# Patient Record
Sex: MALE | Race: WHITE | Employment: FULL TIME | URBAN - METROPOLITAN AREA
[De-identification: names, ages, dates, MRNs, and addresses within clinical notes are randomized per-mention and may not be internally consistent; named-entity substitution may affect disease eponyms.]

---

## 2017-01-17 ENCOUNTER — OFFICE VISIT (OUTPATIENT)
Dept: FAMILY MEDICINE CLINIC | Facility: CLINIC | Age: 47
End: 2017-01-17

## 2017-01-17 ENCOUNTER — APPOINTMENT (OUTPATIENT)
Dept: LAB | Age: 47
End: 2017-01-17
Attending: INTERNAL MEDICINE
Payer: COMMERCIAL

## 2017-01-17 VITALS
SYSTOLIC BLOOD PRESSURE: 120 MMHG | HEIGHT: 70 IN | RESPIRATION RATE: 16 BRPM | TEMPERATURE: 98 F | WEIGHT: 177 LBS | DIASTOLIC BLOOD PRESSURE: 80 MMHG | BODY MASS INDEX: 25.34 KG/M2 | HEART RATE: 80 BPM

## 2017-01-17 DIAGNOSIS — E11.65 TYPE 2 DIABETES MELLITUS WITH HYPERGLYCEMIA, WITHOUT LONG-TERM CURRENT USE OF INSULIN (HCC): ICD-10-CM

## 2017-01-17 PROBLEM — E11.9 TYPE 2 DIABETES MELLITUS WITHOUT COMPLICATION, WITHOUT LONG-TERM CURRENT USE OF INSULIN (HCC): Status: ACTIVE | Noted: 2017-01-17

## 2017-01-17 LAB
CREAT UR-SCNC: 57.3 MG/DL
MICROALBUMIN UR-MCNC: 1.67 MG/DL
MICROALBUMIN/CREAT 24H UR-RTO: 29.1 UG/MG (ref ?–30)

## 2017-01-17 PROCEDURE — 84681 ASSAY OF C-PEPTIDE: CPT

## 2017-01-17 PROCEDURE — 82043 UR ALBUMIN QUANTITATIVE: CPT

## 2017-01-17 PROCEDURE — 36415 COLL VENOUS BLD VENIPUNCTURE: CPT

## 2017-01-17 PROCEDURE — 99213 OFFICE O/P EST LOW 20 MIN: CPT | Performed by: INTERNAL MEDICINE

## 2017-01-17 PROCEDURE — 82570 ASSAY OF URINE CREATININE: CPT

## 2017-01-17 PROCEDURE — 83516 IMMUNOASSAY NONANTIBODY: CPT

## 2017-01-17 NOTE — PROGRESS NOTES
[facility] Internal Medicine Office Note  Chief Complaint:   Patient presents with:  Diabetes      HPI:   This is a 55year old male coming in for diabetes follow up  He tests glu once daily.  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  Disp: 180 tablet diabetes. Diagnoses and all orders for this visit:    Type 2 diabetes mellitus with hyperglycemia, without long-term current use of insulin (HCC) - increase metformin to 1000mg BID.  Call office in 2 weeks with glu readings  -f/u for ophtho exam -nereyda

## 2017-01-17 NOTE — PATIENT INSTRUCTIONS
Thank you for choosing Doug Garcia MD at [place] [address]  To Do: Aurelia Prasad  1. Blood work and urine today  2. Call office in 2 weeks with blood sugar readings  3.  Follow up [address] [facility] [place]  [id number] pneumonia shot recommende called informing you that the insurance company approved your testing, please call Central Scheduling at [phone]  Please allow our office 5 business days to contact you regarding any testing results.     Refill policies:   Allow 3 business days for ref

## 2017-01-19 LAB — C-PEPTIDE, SERUM OR PLASMA: 1.6 NG/ML

## 2017-01-20 LAB — GLUTAMIC ACID DECARBOXYLASE AB: 40.4 IU/ML

## 2017-01-24 ENCOUNTER — TELEPHONE (OUTPATIENT)
Dept: ENDOCRINOLOGY CLINIC | Facility: CLINIC | Age: 47
End: 2017-01-24

## 2017-01-24 DIAGNOSIS — E10.9 TYPE 1 DIABETES MELLITUS WITHOUT COMPLICATION (HCC): Primary | ICD-10-CM

## 2017-01-24 DIAGNOSIS — E10.9 TYPE 1 DIABETES MELLITUS MATURITY ONSET (HCC): Primary | ICD-10-CM

## 2017-01-24 NOTE — TELEPHONE ENCOUNTER
Pt is scheduled to come in tomorrow for insulin instruction. Please enter an order in Epic for this visit. ..for insulin/injectable instruction.

## 2017-01-25 ENCOUNTER — NURSE ONLY (OUTPATIENT)
Dept: ENDOCRINOLOGY CLINIC | Facility: CLINIC | Age: 47
End: 2017-01-25

## 2017-01-25 VITALS — BODY MASS INDEX: 22 KG/M2 | SYSTOLIC BLOOD PRESSURE: 132 MMHG | DIASTOLIC BLOOD PRESSURE: 78 MMHG | WEIGHT: 156.5 LBS

## 2017-01-25 DIAGNOSIS — E10.9 TYPE 1 DIABETES MELLITUS MATURITY ONSET (HCC): ICD-10-CM

## 2017-01-26 NOTE — PROGRESS NOTES
Chinyere Dueñas  : 1970 was seen for Injection Instruction:    Date: 17  Start time: 2pm End time: 3pm    /78 mmHg  Wt 156 lb 8 oz  Glucose today: 234 mg/dL  Date  Breakfast    Lunch    Dinner    Bedtime  Comments     Pre  Insulin Unit

## 2017-02-01 ENCOUNTER — NURSE ONLY (OUTPATIENT)
Dept: ENDOCRINOLOGY CLINIC | Facility: CLINIC | Age: 47
End: 2017-02-01

## 2017-02-01 DIAGNOSIS — E10.9 TYPE 1 DIABETES MELLITUS WITHOUT COMPLICATION (HCC): Primary | ICD-10-CM

## 2017-02-01 PROCEDURE — G0108 DIAB MANAGE TRN  PER INDIV: HCPCS | Performed by: DIETITIAN, REGISTERED

## 2017-02-02 NOTE — PROGRESS NOTES
Cole Boone  DOB12/6/1970 was seen for Diabetic Education Initial/Follow up:    Date: 2/1/2017   Referring MD: Dr. Corby Salinas Start time: 2pm End time: 2:30pm    Assessment:     Changes since last visit:  - Lifestyle: states he is living very health patterns: fastings remain elevated;premeal readings gradually reducing     Hyperglycemia  - Causes/symptoms/prevention/treatment  - High Blood Glucose: >300 mg/dL  - Contact MD if >2 BG >300 mg/dL in 1 week  - Testing urine for ketones with negative result

## 2017-03-01 ENCOUNTER — APPOINTMENT (OUTPATIENT)
Dept: ENDOCRINOLOGY CLINIC | Facility: CLINIC | Age: 47
End: 2017-03-01

## 2017-03-01 VITALS — BODY MASS INDEX: 26 KG/M2 | WEIGHT: 178 LBS

## 2017-03-01 DIAGNOSIS — E10.9 TYPE 1 DIABETES MELLITUS WITHOUT COMPLICATION (HCC): Primary | ICD-10-CM

## 2017-03-01 PROCEDURE — G0108 DIAB MANAGE TRN  PER INDIV: HCPCS | Performed by: DIETITIAN, REGISTERED

## 2017-03-01 RX ORDER — INSULIN ASPART 100 [IU]/ML
INJECTION, SOLUTION INTRAVENOUS; SUBCUTANEOUS
Qty: 15 ML | Refills: 6 | Status: SHIPPED | OUTPATIENT
Start: 2017-03-01 | End: 2018-01-24

## 2017-03-01 NOTE — PROGRESS NOTES
Thalia Ramirez  : 1970 was seen for Diabetic Education Initial/Follow up:    Date: 3/1/2017  Referring MD: Dr. Maryse Cosme Start time: 2pm End time: 2:30pm    Assessment:     Assessment:  Wt 178 lb    Changes since last visit:  - Kenyatta Calhoun ;provided w/ travel letter  Site inspection: no lipodystrophy    PROBLEM SOLVING:  Review SMBG/Food log;observe patterns: starting to experience elevated readings;suggested to add scale for elevated levels    Hyperglycemia  - Causes/symptoms/prevention/gianna

## 2017-03-15 PROBLEM — E10.65 TYPE 1 DIABETES MELLITUS WITH HYPERGLYCEMIA (HCC): Status: ACTIVE | Noted: 2017-03-15

## 2017-03-15 PROBLEM — Z79.4 LONG-TERM INSULIN USE (HCC): Status: ACTIVE | Noted: 2017-03-15

## 2017-03-23 ENCOUNTER — TELEPHONE (OUTPATIENT)
Dept: ENDOCRINOLOGY CLINIC | Facility: CLINIC | Age: 47
End: 2017-03-23

## 2017-03-29 NOTE — TELEPHONE ENCOUNTER
Pt. States that he will be going out of town for Spring Break so will call when he returns. Endo made aware.

## 2017-04-13 ENCOUNTER — TELEPHONE (OUTPATIENT)
Dept: ENDOCRINOLOGY CLINIC | Facility: CLINIC | Age: 47
End: 2017-04-13

## 2017-04-13 DIAGNOSIS — E10.65 TYPE 1 DIABETES MELLITUS WITH HYPERGLYCEMIA (HCC): Primary | ICD-10-CM

## 2017-04-13 NOTE — TELEPHONE ENCOUNTER
Pt. contacted me regarding Dr. Miller Sees suggestion to have a diagnostic Dexcom. I have pended the order for your signature.  Please advise

## 2017-04-24 NOTE — TELEPHONE ENCOUNTER
Pt. Contacted & scheduled for Diagnostic Dexcom this Thursday 4/27/17 for insertion & will return on Thursday 5/4/17 for download. Pt. V/u & was agreeable w/plan.

## 2017-04-27 ENCOUNTER — NURSE ONLY (OUTPATIENT)
Dept: ENDOCRINOLOGY CLINIC | Facility: CLINIC | Age: 47
End: 2017-04-27

## 2017-04-27 DIAGNOSIS — E10.65 TYPE 1 DIABETES MELLITUS WITH HYPERGLYCEMIA (HCC): Primary | ICD-10-CM

## 2017-04-27 PROCEDURE — 95250 CONT GLUC MNTR PHYS/QHP EQP: CPT | Performed by: DIETITIAN, REGISTERED

## 2017-04-27 NOTE — PROGRESS NOTES
Feelis Alberto  : 1970 was seen for Diagnostic Continuous Glucose Sensor Initial Evaluation:    Date: 2017  Start time: 3pm End time: 4pm      Dexcom G4 [place]  Rec.  #[id number]  Sensor Lot: [id number]  Expiration: 10/20/17  Sensor placed: Rt.

## 2017-05-03 ENCOUNTER — TELEPHONE (OUTPATIENT)
Dept: FAMILY MEDICINE CLINIC | Facility: CLINIC | Age: 47
End: 2017-05-03

## 2017-05-04 ENCOUNTER — TELEPHONE (OUTPATIENT)
Dept: FAMILY MEDICINE CLINIC | Facility: CLINIC | Age: 47
End: 2017-05-04

## 2017-05-04 ENCOUNTER — NURSE ONLY (OUTPATIENT)
Dept: ENDOCRINOLOGY CLINIC | Facility: CLINIC | Age: 47
End: 2017-05-04

## 2017-05-04 DIAGNOSIS — E10.65 TYPE 1 DIABETES MELLITUS WITH HYPERGLYCEMIA (HCC): Primary | ICD-10-CM

## 2017-05-04 PROCEDURE — G0108 DIAB MANAGE TRN  PER INDIV: HCPCS | Performed by: DIETITIAN, REGISTERED

## 2017-05-05 ENCOUNTER — TELEPHONE (OUTPATIENT)
Dept: ENDOCRINOLOGY CLINIC | Facility: CLINIC | Age: 47
End: 2017-05-05

## 2017-05-05 NOTE — PROGRESS NOTES
Leeroy Reno  : 1970 was seen for Continuous Glucose Sensor Follow-up Visit:    Date: 2017  Start time: 10;30am End time: 11am    Sensor Type: Dexcom G4    Site Assessment: no problems    Reviewed CGMS download and patient logs:  Days of s

## 2017-05-05 NOTE — TELEPHONE ENCOUNTER
Faxed completed Pt. insurance information & assignment of benefits form to [id number] w/confirmation received

## 2017-05-15 ENCOUNTER — TELEPHONE (OUTPATIENT)
Dept: FAMILY MEDICINE CLINIC | Facility: CLINIC | Age: 47
End: 2017-05-15

## 2017-05-15 NOTE — TELEPHONE ENCOUNTER
Orders for continuous glucose monitor faxed to us from [facility], but patient sees endocrinology, Dr. Kaitlin Packer. I sent back fax with that information. Should be filled out by MD managing DM.

## 2017-05-16 ENCOUNTER — TELEPHONE (OUTPATIENT)
Dept: FAMILY MEDICINE CLINIC | Facility: CLINIC | Age: 47
End: 2017-05-16

## 2017-05-26 ENCOUNTER — OFFICE VISIT (OUTPATIENT)
Dept: FAMILY MEDICINE CLINIC | Facility: CLINIC | Age: 47
End: 2017-05-26

## 2017-05-26 VITALS
SYSTOLIC BLOOD PRESSURE: 130 MMHG | WEIGHT: 186 LBS | HEIGHT: 70 IN | BODY MASS INDEX: 26.63 KG/M2 | DIASTOLIC BLOOD PRESSURE: 70 MMHG | RESPIRATION RATE: 16 BRPM | HEART RATE: 84 BPM

## 2017-05-26 DIAGNOSIS — Z00.00 WELLNESS EXAMINATION: Primary | ICD-10-CM

## 2017-05-26 DIAGNOSIS — E10.9 TYPE 1 DIABETES MELLITUS WITHOUT COMPLICATION (HCC): ICD-10-CM

## 2017-05-26 DIAGNOSIS — Z00.00 LABORATORY EXAM ORDERED AS PART OF ROUTINE GENERAL MEDICAL EXAMINATION: ICD-10-CM

## 2017-05-26 PROCEDURE — 99396 PREV VISIT EST AGE 40-64: CPT | Performed by: INTERNAL MEDICINE

## 2017-05-26 NOTE — PATIENT INSTRUCTIONS
Thank you for choosing Leonarda Craft MD at [place] [address]  To Do: Roise Oyster  1.  Consider prevnar vaccine for pneumonia and then pneumovax vaccine 1 year later    • Please signup for MY CHART, which is electronic access to your record if you h Central Scheduling at [phone]  Please allow our office 5 business days to contact you regarding any testing results.     Refill policies:   Allow 3 business days for refills; controlled substances may take longer and must be picked up from the office i

## 2017-05-26 NOTE — PROGRESS NOTES
[facility] Internal Medicine Office Note  Chief Complaint:   Patient presents with:  Diabetes      HPI:   This is a 55year old male coming in for diabetes follow up and physical  He follows with Dr. 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   Abdominal: Soft. Bowel sounds are normal.   Lymphadenopathy:     He has no cervical adenopathy. Neurological: He is alert. Psychiatric: He has a normal mood and affect.        ASSESSMENT AND P

## 2017-06-26 NOTE — ADDENDUM NOTE
Encounter addended by: Brook Meehan MA on: 6/26/2017  2:27 PM<BR>    Actions taken: Letter status changed

## 2017-07-12 PROBLEM — E10.65 TYPE 1 DIABETES MELLITUS WITH HYPERGLYCEMIA (HCC): Status: RESOLVED | Noted: 2017-03-15 | Resolved: 2017-07-12

## 2017-07-12 PROBLEM — E78.5 HYPERLIPIDEMIA, UNSPECIFIED: Status: ACTIVE | Noted: 2017-07-12

## 2017-08-31 ENCOUNTER — TELEPHONE (OUTPATIENT)
Dept: FAMILY MEDICINE CLINIC | Facility: CLINIC | Age: 47
End: 2017-08-31

## 2017-09-14 RX ORDER — INSULIN ASPART 100 [IU]/ML
INJECTION, SOLUTION INTRAVENOUS; SUBCUTANEOUS
Qty: 15 ML | Refills: 6 | Status: CANCELLED
Start: 2017-09-14

## 2017-09-14 NOTE — TELEPHONE ENCOUNTER
From: Cole Boone  Sent: 9/14/2017 10:11 AM CDT  Subject: Medication Renewal Request    Cole Boone would like a refill of the following medications:     insulin aspart (NOVOLOG) 100 UNIT/ML Subcutaneous Solution Gutierrez Valderrama MD]    [place]

## 2017-09-15 NOTE — TELEPHONE ENCOUNTER
Requesting Novolog   Filled: 3/1/17 #15ml  with 6 refills    Future Appointments  Date Time Provider Dominguez Pepe   10/11/2017 1:20 PM Natividad Davis MD [facility] AT [facility]       Confirmed with Peacham he still has refills available.      Time started: 131

## 2017-10-01 ENCOUNTER — MED REC SCAN ONLY (OUTPATIENT)
Dept: FAMILY MEDICINE CLINIC | Facility: CLINIC | Age: 47
End: 2017-10-01

## 2017-10-22 ENCOUNTER — HOSPITAL ENCOUNTER (EMERGENCY)
Age: 47
Discharge: HOME | End: 2017-10-22
Attending: EMERGENCY MEDICINE
Payer: COMMERCIAL

## 2017-10-22 VITALS
HEIGHT: 70 IN | OXYGEN SATURATION: 97 % | TEMPERATURE: 98 F | HEART RATE: 101 BPM | SYSTOLIC BLOOD PRESSURE: 144 MMHG | BODY MASS INDEX: 27.2 KG/M2 | DIASTOLIC BLOOD PRESSURE: 93 MMHG | WEIGHT: 190 LBS | RESPIRATION RATE: 20 BRPM

## 2017-10-22 DIAGNOSIS — S05.02XA ABRASION OF LEFT CORNEA, INITIAL ENCOUNTER: Primary | ICD-10-CM

## 2017-10-22 PROCEDURE — 99283 EMERGENCY DEPT VISIT LOW MDM: CPT

## 2017-10-22 RX ORDER — SULFACETAMIDE SODIUM 100 MG/ML
2 SOLUTION/ DROPS OPHTHALMIC 4 TIMES DAILY
Qty: 1 BOTTLE | Refills: 0 | Status: SHIPPED | OUTPATIENT
Start: 2017-10-22 | End: 2017-10-27

## 2017-10-22 RX ORDER — TETRACAINE HYDROCHLORIDE 5 MG/ML
SOLUTION OPHTHALMIC
Status: DISCONTINUED
Start: 2017-10-22 | End: 2017-10-22

## 2017-10-22 RX ORDER — TETRACAINE HYDROCHLORIDE 5 MG/ML
1 SOLUTION OPHTHALMIC ONCE
Status: COMPLETED | OUTPATIENT
Start: 2017-10-22 | End: 2017-10-22

## 2017-10-22 NOTE — ED PROVIDER NOTES
Patient Seen in: [facility] Emergency Department In [place]    History   Patient presents with: Eye Visual Problem (opthalmic)    Stated Complaint: Eye pain    HPI    51-year-old male presents emergency room with chief complaint of left eye injury.   Zaina 177.8 cm (5' 10\")   Wt 86.2 kg   SpO2 97%   BMI 27.26 kg/m²         Physical Exam    GENERAL: Patient is awake, alert, well-appearing, in no acute distress.   HEENT: Pupils equal round reactive to light, extraocular muscles are intact, there is no scleral

## 2018-05-16 PROCEDURE — 82570 ASSAY OF URINE CREATININE: CPT | Performed by: INTERNAL MEDICINE

## 2018-05-16 PROCEDURE — 84681 ASSAY OF C-PEPTIDE: CPT | Performed by: INTERNAL MEDICINE

## 2018-05-16 PROCEDURE — 82043 UR ALBUMIN QUANTITATIVE: CPT | Performed by: INTERNAL MEDICINE

## 2018-06-18 DIAGNOSIS — R74.8 ELEVATED LIVER ENZYMES: Primary | ICD-10-CM

## 2018-12-03 ENCOUNTER — TELEPHONE (OUTPATIENT)
Dept: INTERNAL MEDICINE CLINIC | Facility: CLINIC | Age: 48
End: 2018-12-03

## 2019-05-15 ENCOUNTER — LAB ENCOUNTER (OUTPATIENT)
Dept: LAB | Age: 49
End: 2019-05-15
Attending: INTERNAL MEDICINE
Payer: COMMERCIAL

## 2019-05-15 DIAGNOSIS — R74.8 ELEVATED LIVER ENZYMES: ICD-10-CM

## 2019-05-15 PROBLEM — R79.89 ELEVATED LFTS: Status: ACTIVE | Noted: 2019-05-15

## 2019-05-15 PROBLEM — E10.3313: Status: ACTIVE | Noted: 2019-05-15

## 2019-05-15 PROBLEM — I10 ESSENTIAL HYPERTENSION: Status: ACTIVE | Noted: 2019-05-15

## 2019-05-15 PROBLEM — E78.5 HYPERLIPIDEMIA, UNSPECIFIED HYPERLIPIDEMIA TYPE: Status: ACTIVE | Noted: 2017-07-12

## 2019-05-15 PROBLEM — E10.649 CONTROLLED TYPE 1 DIABETES MELLITUS WITH HYPOGLYCEMIA (HCC): Status: ACTIVE | Noted: 2017-01-17

## 2019-05-15 PROCEDURE — 83540 ASSAY OF IRON: CPT

## 2019-05-15 PROCEDURE — 86706 HEP B SURFACE ANTIBODY: CPT

## 2019-05-15 PROCEDURE — 87340 HEPATITIS B SURFACE AG IA: CPT

## 2019-05-15 PROCEDURE — 86803 HEPATITIS C AB TEST: CPT

## 2019-05-15 PROCEDURE — 86704 HEP B CORE ANTIBODY TOTAL: CPT

## 2019-05-15 PROCEDURE — 83550 IRON BINDING TEST: CPT

## 2019-05-20 DIAGNOSIS — R74.8 ELEVATED LIVER ENZYMES: Primary | ICD-10-CM

## 2020-05-15 ENCOUNTER — TELEPHONE (OUTPATIENT)
Dept: INTERNAL MEDICINE CLINIC | Facility: CLINIC | Age: 50
End: 2020-05-15

## 2021-04-19 ENCOUNTER — IMMUNIZATION (OUTPATIENT)
Dept: LAB | Facility: HOSPITAL | Age: 51
End: 2021-04-19
Attending: HOSPITALIST
Payer: COMMERCIAL

## 2021-04-19 DIAGNOSIS — Z23 NEED FOR VACCINATION: Primary | ICD-10-CM

## 2021-04-19 PROCEDURE — 0011A SARSCOV2 VAC 100MCG/0.5ML IM: CPT

## 2021-05-17 ENCOUNTER — IMMUNIZATION (OUTPATIENT)
Dept: LAB | Facility: HOSPITAL | Age: 51
End: 2021-05-17
Attending: EMERGENCY MEDICINE
Payer: COMMERCIAL

## 2021-05-17 DIAGNOSIS — Z23 NEED FOR VACCINATION: Primary | ICD-10-CM

## 2021-05-17 PROCEDURE — 0012A SARSCOV2 VAC 100MCG/0.5ML IM: CPT

## 2021-05-27 ENCOUNTER — OFFICE VISIT (OUTPATIENT)
Dept: INTERNAL MEDICINE CLINIC | Facility: CLINIC | Age: 51
End: 2021-05-27
Payer: COMMERCIAL

## 2021-05-27 ENCOUNTER — LAB ENCOUNTER (OUTPATIENT)
Dept: LAB | Age: 51
End: 2021-05-27
Attending: INTERNAL MEDICINE
Payer: COMMERCIAL

## 2021-05-27 VITALS
RESPIRATION RATE: 16 BRPM | WEIGHT: 196.81 LBS | OXYGEN SATURATION: 99 % | HEIGHT: 69.5 IN | HEART RATE: 90 BPM | BODY MASS INDEX: 28.49 KG/M2 | TEMPERATURE: 99 F | DIASTOLIC BLOOD PRESSURE: 78 MMHG | SYSTOLIC BLOOD PRESSURE: 130 MMHG

## 2021-05-27 DIAGNOSIS — E10.649 CONTROLLED TYPE 1 DIABETES MELLITUS WITH HYPOGLYCEMIA (HCC): ICD-10-CM

## 2021-05-27 DIAGNOSIS — Z00.00 LABORATORY EXAM ORDERED AS PART OF ROUTINE GENERAL MEDICAL EXAMINATION: ICD-10-CM

## 2021-05-27 DIAGNOSIS — E78.5 HYPERLIPIDEMIA, UNSPECIFIED HYPERLIPIDEMIA TYPE: ICD-10-CM

## 2021-05-27 DIAGNOSIS — Z12.5 SCREENING FOR PROSTATE CANCER: ICD-10-CM

## 2021-05-27 DIAGNOSIS — Z00.00 ENCOUNTER FOR ROUTINE ADULT MEDICAL EXAMINATION: Primary | ICD-10-CM

## 2021-05-27 DIAGNOSIS — Z12.5 PROSTATE CANCER SCREENING: ICD-10-CM

## 2021-05-27 DIAGNOSIS — Z79.4 LONG-TERM INSULIN USE (HCC): ICD-10-CM

## 2021-05-27 DIAGNOSIS — Z12.11 SCREENING FOR COLON CANCER: ICD-10-CM

## 2021-05-27 DIAGNOSIS — E10.3313 MODERATE NONPROLIFERATIVE DIABETIC RETINOPATHY OF BOTH EYES WITH MACULAR EDEMA ASSOCIATED WITH TYPE 1 DIABETES MELLITUS (HCC): ICD-10-CM

## 2021-05-27 PROCEDURE — 3078F DIAST BP <80 MM HG: CPT | Performed by: INTERNAL MEDICINE

## 2021-05-27 PROCEDURE — 80053 COMPREHEN METABOLIC PANEL: CPT | Performed by: INTERNAL MEDICINE

## 2021-05-27 PROCEDURE — 99396 PREV VISIT EST AGE 40-64: CPT | Performed by: INTERNAL MEDICINE

## 2021-05-27 PROCEDURE — 3008F BODY MASS INDEX DOCD: CPT | Performed by: INTERNAL MEDICINE

## 2021-05-27 PROCEDURE — 3075F SYST BP GE 130 - 139MM HG: CPT | Performed by: INTERNAL MEDICINE

## 2021-05-27 PROCEDURE — 85025 COMPLETE CBC W/AUTO DIFF WBC: CPT | Performed by: INTERNAL MEDICINE

## 2021-05-27 PROCEDURE — 84153 ASSAY OF PSA TOTAL: CPT | Performed by: INTERNAL MEDICINE

## 2021-05-27 NOTE — PROGRESS NOTES
[facility] Internal Medicine Office Note  Chief Complaint:   Patient presents with:  Physical      HPI:   This is a 48year old male coming in for physical  HPI    He feels well     Type I DM - sees Dr Cantu Nine  Starting injections for diabetic retino tablet 3   • Insulin Degludec (TRESIBA FLEXTOUCH) 100 UNIT/ML Subcutaneous Solution Pen-injector Inject 18 Units into the skin daily.  30 mL 1   • Insulin Pen Needle (BD PEN NEEDLE JCARLOS U/F) 32G X 4 MM Does not apply Misc 4 injections / day 400 each 3   • B in acute distress. Appearance: He is well-developed. HENT:      Head: Normocephalic and atraumatic. Eyes:      Conjunctiva/sclera: Conjunctivae normal.   Cardiovascular:      Rate and Rhythm: Normal rate and regular rhythm.       Heart sounds: Bipin Karen done  Zoster Vaccines(1 of 2) Never done  Patient/Caregiver Education: Patient/Caregiver Education: There are no barriers to learning. Medical education done. Outcome: Patient verbalizes understanding.  Patient is notified to call with any questions, compli

## 2021-06-18 ENCOUNTER — TELEPHONE (OUTPATIENT)
Dept: INTERNAL MEDICINE CLINIC | Facility: CLINIC | Age: 51
End: 2021-06-18

## 2021-06-18 DIAGNOSIS — R74.8 ELEVATED LIVER ENZYMES: Primary | ICD-10-CM

## 2021-06-18 NOTE — TELEPHONE ENCOUNTER
Call patient - Elevated liver enzymes seen on blood work done with Dr. Yoandy Bernstein. They are improved compared to levels back in 2019. I do recommend getting an ultrasound of the abdomen to look for any structural causes such as fatty liver.  It looks like one was

## 2021-07-21 ENCOUNTER — HOSPITAL ENCOUNTER (OUTPATIENT)
Dept: ULTRASOUND IMAGING | Age: 51
Discharge: HOME | End: 2021-07-21
Attending: INTERNAL MEDICINE
Payer: COMMERCIAL

## 2021-07-21 DIAGNOSIS — R74.8 ELEVATED LIVER ENZYMES: ICD-10-CM

## 2021-07-21 PROCEDURE — 76700 US EXAM ABDOM COMPLETE: CPT | Performed by: INTERNAL MEDICINE

## 2021-07-27 ENCOUNTER — TELEPHONE (OUTPATIENT)
Dept: INTERNAL MEDICINE CLINIC | Facility: CLINIC | Age: 51
End: 2021-07-27

## 2021-10-12 DIAGNOSIS — K76.0 FATTY LIVER: ICD-10-CM

## 2021-10-12 DIAGNOSIS — R79.89 ABNORMAL LIVER FUNCTION TESTS: Primary | ICD-10-CM

## 2022-12-10 ENCOUNTER — HOSPITAL ENCOUNTER (EMERGENCY)
Age: 52
Discharge: HOME | End: 2022-12-10
Attending: STUDENT IN AN ORGANIZED HEALTH CARE EDUCATION/TRAINING PROGRAM
Payer: COMMERCIAL

## 2022-12-10 VITALS
SYSTOLIC BLOOD PRESSURE: 146 MMHG | OXYGEN SATURATION: 96 % | WEIGHT: 200 LBS | DIASTOLIC BLOOD PRESSURE: 81 MMHG | BODY MASS INDEX: 28.63 KG/M2 | TEMPERATURE: 98 F | HEIGHT: 70 IN | HEART RATE: 113 BPM | RESPIRATION RATE: 16 BRPM

## 2022-12-10 DIAGNOSIS — H65.02 NON-RECURRENT ACUTE SEROUS OTITIS MEDIA OF LEFT EAR: Primary | ICD-10-CM

## 2022-12-10 LAB
POCT INFLUENZA A: NEGATIVE
POCT INFLUENZA B: NEGATIVE
SARS-COV-2 RNA RESP QL NAA+PROBE: NOT DETECTED

## 2022-12-10 PROCEDURE — 99283 EMERGENCY DEPT VISIT LOW MDM: CPT | Performed by: STUDENT IN AN ORGANIZED HEALTH CARE EDUCATION/TRAINING PROGRAM

## 2022-12-10 PROCEDURE — 87502 INFLUENZA DNA AMP PROBE: CPT | Performed by: STUDENT IN AN ORGANIZED HEALTH CARE EDUCATION/TRAINING PROGRAM

## 2022-12-10 RX ORDER — LISINOPRIL 10 MG/1
10 TABLET ORAL DAILY
COMMUNITY

## 2022-12-10 RX ORDER — AMOXICILLIN AND CLAVULANATE POTASSIUM 875; 125 MG/1; MG/1
1 TABLET, FILM COATED ORAL 2 TIMES DAILY
Qty: 20 TABLET | Refills: 0 | Status: SHIPPED | OUTPATIENT
Start: 2022-12-10 | End: 2022-12-20

## 2022-12-10 NOTE — ED INITIAL ASSESSMENT (HPI)
Cough and congestion for 3 days- awoke this am with l ear pain and bilat eye drainage and eye crusted shut this am

## 2023-02-15 ENCOUNTER — LAB ENCOUNTER (OUTPATIENT)
Dept: LAB | Age: 53
End: 2023-02-15
Attending: INTERNAL MEDICINE
Payer: COMMERCIAL

## 2023-02-15 ENCOUNTER — OFFICE VISIT (OUTPATIENT)
Dept: INTERNAL MEDICINE CLINIC | Facility: CLINIC | Age: 53
End: 2023-02-15
Payer: COMMERCIAL

## 2023-02-15 ENCOUNTER — TELEPHONE (OUTPATIENT)
Dept: INTERNAL MEDICINE CLINIC | Facility: CLINIC | Age: 53
End: 2023-02-15

## 2023-02-15 VITALS
TEMPERATURE: 98 F | HEART RATE: 86 BPM | OXYGEN SATURATION: 100 % | RESPIRATION RATE: 16 BRPM | BODY MASS INDEX: 30.46 KG/M2 | DIASTOLIC BLOOD PRESSURE: 80 MMHG | SYSTOLIC BLOOD PRESSURE: 120 MMHG | WEIGHT: 210.38 LBS | HEIGHT: 69.5 IN

## 2023-02-15 DIAGNOSIS — Z23 NEED FOR TDAP VACCINATION: ICD-10-CM

## 2023-02-15 DIAGNOSIS — E10.319 CONTROLLED TYPE 1 DIABETES MELLITUS WITH RETINOPATHY, MACULAR EDEMA PRESENCE UNSPECIFIED, UNSPECIFIED LATERALITY, UNSPECIFIED RETINOPATHY SEVERITY (HCC): ICD-10-CM

## 2023-02-15 DIAGNOSIS — Z00.00 ENCOUNTER FOR ROUTINE ADULT MEDICAL EXAMINATION: ICD-10-CM

## 2023-02-15 DIAGNOSIS — Z12.11 SCREENING FOR COLON CANCER: ICD-10-CM

## 2023-02-15 DIAGNOSIS — Z12.5 SCREENING FOR PROSTATE CANCER: ICD-10-CM

## 2023-02-15 DIAGNOSIS — Z00.00 ENCOUNTER FOR ROUTINE ADULT MEDICAL EXAMINATION: Primary | ICD-10-CM

## 2023-02-15 LAB
BASOPHILS # BLD AUTO: 0.05 X10(3) UL (ref 0–0.2)
BASOPHILS NFR BLD AUTO: 0.7 %
COMPLEXED PSA SERPL-MCNC: 1.63 NG/ML (ref ?–4)
EOSINOPHIL # BLD AUTO: 0.08 X10(3) UL (ref 0–0.7)
EOSINOPHIL NFR BLD AUTO: 1.1 %
ERYTHROCYTE [DISTWIDTH] IN BLOOD BY AUTOMATED COUNT: 12.3 %
HCT VFR BLD AUTO: 45.3 %
HGB BLD-MCNC: 15.4 G/DL
IMM GRANULOCYTES # BLD AUTO: 0.02 X10(3) UL (ref 0–1)
IMM GRANULOCYTES NFR BLD: 0.3 %
LYMPHOCYTES # BLD AUTO: 1.75 X10(3) UL (ref 1–4)
LYMPHOCYTES NFR BLD AUTO: 24.4 %
MCH RBC QN AUTO: 33.8 PG (ref 26–34)
MCHC RBC AUTO-ENTMCNC: 34 G/DL (ref 31–37)
MCV RBC AUTO: 99.6 FL
MONOCYTES # BLD AUTO: 0.96 X10(3) UL (ref 0.1–1)
MONOCYTES NFR BLD AUTO: 13.4 %
NEUTROPHILS # BLD AUTO: 4.32 X10 (3) UL (ref 1.5–7.7)
NEUTROPHILS # BLD AUTO: 4.32 X10(3) UL (ref 1.5–7.7)
NEUTROPHILS NFR BLD AUTO: 60.1 %
PLATELET # BLD AUTO: 172 10(3)UL (ref 150–450)
RBC # BLD AUTO: 4.55 X10(6)UL
WBC # BLD AUTO: 7.2 X10(3) UL (ref 4–11)

## 2023-02-15 PROCEDURE — 36415 COLL VENOUS BLD VENIPUNCTURE: CPT

## 2023-02-15 PROCEDURE — 99396 PREV VISIT EST AGE 40-64: CPT | Performed by: INTERNAL MEDICINE

## 2023-02-15 PROCEDURE — 3074F SYST BP LT 130 MM HG: CPT | Performed by: INTERNAL MEDICINE

## 2023-02-15 PROCEDURE — 90471 IMMUNIZATION ADMIN: CPT | Performed by: INTERNAL MEDICINE

## 2023-02-15 PROCEDURE — 3008F BODY MASS INDEX DOCD: CPT | Performed by: INTERNAL MEDICINE

## 2023-02-15 PROCEDURE — 85025 COMPLETE CBC W/AUTO DIFF WBC: CPT

## 2023-02-15 PROCEDURE — 90715 TDAP VACCINE 7 YRS/> IM: CPT | Performed by: INTERNAL MEDICINE

## 2023-02-15 PROCEDURE — 3079F DIAST BP 80-89 MM HG: CPT | Performed by: INTERNAL MEDICINE

## 2023-02-15 RX ORDER — INSULIN ASPART 100 [IU]/ML
INJECTION, SOLUTION INTRAVENOUS; SUBCUTANEOUS
COMMUNITY

## 2023-02-15 RX ORDER — INSULIN PMP CART,AUT,G6/7,CNTR
EACH SUBCUTANEOUS
COMMUNITY
Start: 2023-01-10

## 2023-05-23 ENCOUNTER — TELEPHONE (OUTPATIENT)
Dept: INTERNAL MEDICINE CLINIC | Facility: CLINIC | Age: 53
End: 2023-05-23

## 2023-05-23 DIAGNOSIS — K76.0 FATTY LIVER: ICD-10-CM

## 2023-05-23 DIAGNOSIS — R74.8 ELEVATED LIVER ENZYMES: Primary | ICD-10-CM

## 2023-05-23 RX ORDER — INSULIN ASPART 100 [IU]/ML
INJECTION, SOLUTION INTRAVENOUS; SUBCUTANEOUS
Refills: 0 | OUTPATIENT
Start: 2023-05-23

## 2023-05-23 RX ORDER — INSULIN PMP CART,AUT,G6/7,CNTR
EACH SUBCUTANEOUS
Refills: 0 | OUTPATIENT
Start: 2023-05-23

## 2023-05-23 NOTE — TELEPHONE ENCOUNTER
Incoming fax from Jane Lopez office with CMP results   Dr. Ivory Lopez is concerned about patients LFT's  Results in epic   Please advise

## 2023-05-24 PROBLEM — Z12.11 SPECIAL SCREENING FOR MALIGNANT NEOPLASM OF COLON: Status: ACTIVE | Noted: 2023-05-24

## 2023-05-24 NOTE — TELEPHONE ENCOUNTER
Let patient know Dr. Oz Rinaldi sent me a copy of his liver enzymes which were elevated and had improved on most recent blood work. I recommend to get an ultrasound of the liver with elastography to further examine to look for fibrosis related to fatty liver. Advanced fibrosis can become cirrhosis. This is a different type of ultrasound than what he had in 2021.

## 2023-06-12 NOTE — TELEPHONE ENCOUNTER
Pt returned call, gave message from Dr. Macy Vargas. Pt asked for the central scheduling phone number to be sent to him through [address] as he could not write anything down right now. [facility] sent.

## 2023-06-12 NOTE — TELEPHONE ENCOUNTER
[phone] [facility]     [facility] asking for a return call.

## 2023-10-02 ENCOUNTER — TELEPHONE (OUTPATIENT)
Dept: INTERNAL MEDICINE CLINIC | Facility: CLINIC | Age: 53
End: 2023-10-02

## 2023-10-02 LAB
HEMOGLOBIN A1C: 6.7 %
HEMOGLOBIN A1C: 6.7 %

## 2023-10-03 LAB — HEMOGLOBIN A1C: 6.7 %

## 2024-01-06 ENCOUNTER — HOSPITAL ENCOUNTER (EMERGENCY)
Age: 54
Discharge: HOME | End: 2024-01-06
Attending: EMERGENCY MEDICINE
Payer: COMMERCIAL

## 2024-01-06 VITALS
HEART RATE: 83 BPM | OXYGEN SATURATION: 96 % | BODY MASS INDEX: 29.06 KG/M2 | HEIGHT: 70 IN | WEIGHT: 203 LBS | DIASTOLIC BLOOD PRESSURE: 75 MMHG | SYSTOLIC BLOOD PRESSURE: 132 MMHG | RESPIRATION RATE: 18 BRPM | TEMPERATURE: 100 F

## 2024-01-06 DIAGNOSIS — R79.89 ELEVATED LFTS: ICD-10-CM

## 2024-01-06 DIAGNOSIS — D69.6 THROMBOCYTOPENIA (HCC): ICD-10-CM

## 2024-01-06 DIAGNOSIS — U07.1 COVID: Primary | ICD-10-CM

## 2024-01-06 LAB
ALBUMIN SERPL-MCNC: 3.2 G/DL (ref 3.4–5)
ALBUMIN/GLOB SERPL: 0.9 {RATIO} (ref 1–2)
ALP LIVER SERPL-CCNC: 123 U/L
ALT SERPL-CCNC: 58 U/L
ANION GAP SERPL CALC-SCNC: 7 MMOL/L (ref 0–18)
AST SERPL-CCNC: 136 U/L (ref 15–37)
BASE EXCESS BLD CALC-SCNC: -2 MMOL/L
BASOPHILS # BLD AUTO: 0.01 X10(3) UL (ref 0–0.2)
BASOPHILS NFR BLD AUTO: 0.3 %
BILIRUB SERPL-MCNC: 2.3 MG/DL (ref 0.1–2)
BILIRUB UR QL CFM: POSITIVE
BUN BLD-MCNC: 10 MG/DL (ref 9–23)
CALCIUM BLD-MCNC: 8.5 MG/DL (ref 8.5–10.1)
CHLORIDE SERPL-SCNC: 102 MMOL/L (ref 98–112)
CLARITY UR REFRACT.AUTO: CLEAR
CO2 BLD-SCNC: 23 MMOL/L (ref 22–32)
CO2 SERPL-SCNC: 23 MMOL/L (ref 21–32)
CREAT BLD-MCNC: 0.73 MG/DL
EGFRCR SERPLBLD CKD-EPI 2021: 109 ML/MIN/1.73M2 (ref 60–?)
EOSINOPHIL # BLD AUTO: 0 X10(3) UL (ref 0–0.7)
EOSINOPHIL NFR BLD AUTO: 0 %
ERYTHROCYTE [DISTWIDTH] IN BLOOD BY AUTOMATED COUNT: 13.2 %
GLOBULIN PLAS-MCNC: 3.7 G/DL (ref 2.8–4.4)
GLUCOSE BLD-MCNC: 145 MG/DL (ref 70–99)
GLUCOSE UR STRIP.AUTO-MCNC: NEGATIVE MG/DL
HCO3 BLD-SCNC: 22.4 MEQ/L
HCT VFR BLD AUTO: 39.5 %
HGB BLD-MCNC: 13.8 G/DL
IMM GRANULOCYTES # BLD AUTO: 0.01 X10(3) UL (ref 0–1)
IMM GRANULOCYTES NFR BLD: 0.3 %
KETONES UR STRIP.AUTO-MCNC: 40 MG/DL
LEUKOCYTE ESTERASE UR QL STRIP.AUTO: NEGATIVE
LYMPHOCYTES # BLD AUTO: 0.99 X10(3) UL (ref 1–4)
LYMPHOCYTES NFR BLD AUTO: 30.2 %
MCH RBC QN AUTO: 33.7 PG (ref 26–34)
MCHC RBC AUTO-ENTMCNC: 34.9 G/DL (ref 31–37)
MCV RBC AUTO: 96.6 FL
MONOCYTES # BLD AUTO: 1 X10(3) UL (ref 0.1–1)
MONOCYTES NFR BLD AUTO: 30.5 %
NEUTROPHILS # BLD AUTO: 1.27 X10 (3) UL (ref 1.5–7.7)
NEUTROPHILS # BLD AUTO: 1.27 X10(3) UL (ref 1.5–7.7)
NEUTROPHILS NFR BLD AUTO: 38.7 %
NITRITE UR QL STRIP.AUTO: NEGATIVE
OSMOLALITY SERPL CALC.SUM OF ELEC: 276 MOSM/KG (ref 275–295)
PCO2 BLD: 32.1 MMHG
PH BLD: 7.45 [PH]
PH UR STRIP.AUTO: 6 [PH] (ref 5–8)
PLATELET # BLD AUTO: 94 10(3)UL (ref 150–450)
PO2 BLD: 36 MMHG
POCT INFLUENZA A: NEGATIVE
POCT INFLUENZA B: NEGATIVE
POTASSIUM SERPL-SCNC: 3.7 MMOL/L (ref 3.5–5.1)
PROT SERPL-MCNC: 6.9 G/DL (ref 6.4–8.2)
RBC # BLD AUTO: 4.09 X10(6)UL
RBC UR QL AUTO: NEGATIVE
SAO2 % BLD: 73 %
SARS-COV-2 RNA RESP QL NAA+PROBE: DETECTED
SODIUM SERPL-SCNC: 132 MMOL/L (ref 136–145)
SP GR UR STRIP.AUTO: >=1.03 (ref 1–1.03)
UROBILINOGEN UR STRIP.AUTO-MCNC: 2 MG/DL
WBC # BLD AUTO: 3.3 X10(3) UL (ref 4–11)

## 2024-01-06 PROCEDURE — 82803 BLOOD GASES ANY COMBINATION: CPT

## 2024-01-06 PROCEDURE — 80053 COMPREHEN METABOLIC PANEL: CPT | Performed by: PHYSICIAN ASSISTANT

## 2024-01-06 PROCEDURE — 85025 COMPLETE CBC W/AUTO DIFF WBC: CPT | Performed by: PHYSICIAN ASSISTANT

## 2024-01-06 PROCEDURE — 81015 MICROSCOPIC EXAM OF URINE: CPT | Performed by: PHYSICIAN ASSISTANT

## 2024-01-06 PROCEDURE — 87502 INFLUENZA DNA AMP PROBE: CPT | Performed by: PHYSICIAN ASSISTANT

## 2024-01-06 PROCEDURE — 99284 EMERGENCY DEPT VISIT MOD MDM: CPT

## 2024-01-06 PROCEDURE — 87502 INFLUENZA DNA AMP PROBE: CPT | Performed by: EMERGENCY MEDICINE

## 2024-01-06 PROCEDURE — 96360 HYDRATION IV INFUSION INIT: CPT

## 2024-01-06 PROCEDURE — 81001 URINALYSIS AUTO W/SCOPE: CPT | Performed by: PHYSICIAN ASSISTANT

## 2024-01-06 RX ORDER — ACETAMINOPHEN 500 MG
1000 TABLET ORAL ONCE
Status: COMPLETED | OUTPATIENT
Start: 2024-01-06 | End: 2024-01-06

## 2024-01-06 NOTE — RESPIRATORY THERAPY NOTE
i-STAT Testing  Site VEIN  Time 1052  Lloyd's test result N/A  Clinical data 7.45/32/36/22.4/-2/73% ON ROOM AIR, RRBV to Dr Alcazar

## 2024-01-06 NOTE — ED PROVIDER NOTES
Patient Seen in: [facility] In [place]      History     Chief Complaint   Patient presents with    Cough/URI     Stated Complaint: cough - congestion - fevers    Subjective:   HPI    CHIEF COMPLAINT: URI symptoms, recent COVID exposure     HISTORY OF PRESENT ILLNESS: Patient is a 53-year-old male with diabetes who presents for evaluation of URI symptoms.  Reports an intermittently productive cough, congestion and fever.  Symptoms for 3 days.  5 days ago was exposed to COVID.  No previous history of COVID infection.  Had 2 episodes of posttussive emesis yesterday, has tolerated p.o. since.  No diarrhea.  No abdominal pain.  States he has a very sensitive gag reflex and he brought up mucus both times, which induced vomiting.  No chest pain or shortness of breath.  He is drinking well.  States the urine looks dark.  Current blood sugars are in the 160s.     REVIEW OF SYSTEMS:  Constitutional: As above  Eyes: no discharge  ENT: As above  Cardiovascular: no chest pain, no palpitations  Respiratory: As above  Gastrointestinal: no abdominal pain  Genitourinary: no hematuria  Musculoskeletal: no back pain  Skin: no rashes  Neurological: no headache     Otherwise a complete review of systems was obtained and other than the HPI was negative     The patient's medication list, past medical history and social history elements is as listed in today's nurse's notes are reviewed and agree. The patient's family history is reviewed and is noncontributory to the presenting problem, except as indicated as above.    Objective:   No pertinent past medical history.            Past Surgical History:   Procedure Laterality Date    ANESTH,SURGERY OF SHOULDER      OTHER      OTHER      shoulder - left    OTHER SURGICAL HISTORY      Knee surgery, ACL 2001    OTHER SURGICAL HISTORY      R Leg, Unspecified treatment 2 rods placed 1988                Social History     Socioeconomic History    Marital status:     Tobacco Use    Smoking status: Never    Smokeless tobacco: Never   Vaping Use    Vaping Use: Never used   Substance and Sexual Activity    Alcohol use: Not Currently     Comment: 1-3 glasses of scotch daily    Drug use: No   Other Topics Concern    Caffeine Concern Yes     Comment: Daily; 1-2 cups     Exercise Yes     Comment: 4 times a week     Seat Belt Yes              Review of Systems    Positive for stated complaint: cough - congestion - fevers  Other systems are as noted in HPI.  Constitutional and vital signs reviewed.      All other systems reviewed and negative except as noted above.    Physical Exam     ED Triage Vitals [01/06/24 0917]   /72   Pulse 97   Resp 20   Temp (!) 100.5 °F (38.1 °C)   Temp src Temporal   SpO2 98 %   O2 Device None (Room air)       Current:/75   Pulse 83   Temp 99.9 °F (37.7 °C) (Oral)   Resp 18   Ht 177.8 cm (5' 10\")   Wt 92.1 kg   SpO2 96%   BMI 29.13 kg/m²         Physical Exam    Vital signs and nursing notes reviewed  General Appearance: Patient is alert and oriented x4 in no acute distress  Eyes: pupils equal and round, reactive to light. No pallor or injection, no sclera icterus  Ears: Bilateral TMs and canals clear  Throat: There is no erythema or exudates, no tonsillar hypertrophy.  no trismus or stridor. Uvula midline. No phonation changes, patient handling secretions well. Mucous membranes moist.  Respiratory: there are no retractions, lungs are clear to auscultation  Cardiovascular: regular rate and rhythm  Neurological:  Grossly intact, no deficits.  Gait normal.  Skin:  warm and dry, no rashes.  No jaundice. Brisk capillary refill  Musculoskeletal: neck is supple, no lymphadenopathy, non tender. no meningeal signs.  Extremities are symmetrical, full range of motion  Psychiatric: calm and cooperative      ED Course     Labs Reviewed   URINALYSIS, ROUTINE - Abnormal; Notable for the following components:       Result Value    Urine Color Juana (*)      Ketones Urine 40 (*)     Protein Urine 30 mg/dL (*)     Urobilinogen Urine 2.0 (*)     All other components within normal limits   UA MICROSCOPIC ONLY, URINE - Abnormal; Notable for the following components:    Squamous Epi. Cells Few (*)     All other components within normal limits   ICTOTEST - Abnormal; Notable for the following components:    Ictotest Positive (*)     All other components within normal limits   COMP METABOLIC PANEL (14) - Abnormal; Notable for the following components:    Glucose 145 (*)     Sodium 132 (*)      (*)     Alkaline Phosphatase 123 (*)     Bilirubin, Total 2.3 (*)     Albumin 3.2 (*)     A/G Ratio 0.9 (*)     All other components within normal limits   RAPID SARS-COV-2 BY PCR - Abnormal; Notable for the following components:    Rapid SARS-CoV-2 by PCR Detected (*)     All other components within normal limits   CBC W/ DIFFERENTIAL - Abnormal; Notable for the following components:    WBC 3.3 (*)     RBC 4.09 (*)     PLT 94.0 (*)     Neutrophil Absolute Prelim 1.27 (*)     Neutrophil Absolute 1.27 (*)     Lymphocyte Absolute 0.99 (*)     All other components within normal limits   POCT FLU TEST - Normal    Narrative:     This assay is a rapid molecular in vitro test utilizing nucleic acid amplification of influenza A and B viral RNA.   CBC WITH DIFFERENTIAL WITH PLATELET    Narrative:     The following orders were created for panel order CBC With Differential With Platelet.  Procedure                               Abnormality         Status                     ---------                               -----------         ------                     CBC W/ DIFFERENTIAL[[id number]]          Abnormal            Final result                 Please view results for these tests on the individual orders.   VENOUS BLOOD GAS   POCT ISTAT G4 CARTRIDGE             Differential diagnosis is viral URI such as COVID or flu, bronchitis, pneumonia         MDM      This is a well-appearing  53-year-old male who presents for evaluation of URI symptoms for 3 days after being exposed to COVID 5 days ago.  SARS-CoV-2 testing positive.  Influenza testing negative x 2.  Patient had mentioned during the history that the urine appeared dark.  Urinalysis collected and showed wilda-colored urine with some ketones and urobilinogen.  Icotest positive.  Discussed the findings with Dr. Alcazar who also saw and examined this patient.  He recommended VBG, CMP and CBC.  Blood draw was performed.  VBG within normal limits.  CMP shows a slightly elevated glucose at 145.  Sodium low at 132.  AST and alk phos elevated at 136 and 123 respectively.  Total bilirubin 2.3.  Patient has had elevated bilirubin in the past, this is trending upward slightly.  Previous result at 1.9 in May of 2023.  CBC shows a white blood cell count at 3.3, slightly low.  Hemoglobin normal at 13.8 and hematocrit 39.5.  Platelet count low at 94, change from previous.  Discussed these results with Dr. Alcazar and the patient.  Will have patient recheck with primary care.  For now we will initiate Paxlovid.  Creatinine and GFR within normal limits so no renal dosing is needed.  Will have patient hold atorvastatin for the next 10 days.  Close follow-up with primary care.  Stay away from people for 5 full days starting from your first full days of symptoms.  Strict indoor masking for the 5 days following quarantine.   Avoid acetaminophen based products for now.  If you fever that lasts longer than 5 days, chest pain or shortness of breath go to the ER.  Patient voiced understanding to the treatment plan.  All questions answered.    I sent patient's primary care provider note through the [employer] system to ask her to follow-up with the patient and order repeat lab work.                                   Medical Decision Making  Amount and/or Complexity of Data Reviewed  External Data Reviewed: labs.  Labs: ordered. Decision-making details documented in ED  Course.    Risk  Prescription drug management.        Disposition and Plan     Clinical Impression:  1. COVID    2. Elevated LFTs    3. Thrombocytopenia (HCC)         Disposition:  Discharge  1/6/2024 11:41 am    Follow-up:  Matilde Dinh MD  [id number] [address] [address]  [phone]    Call in 2 day(s)            Medications Prescribed:  Discharge Medication List as of 1/6/2024 11:44 AM        START taking these medications    Details   nirmatrelvir-ritonavir 300-100 MG Oral Tablet Therapy Pack Take two nirmatrelvir tablets (300mg) with one ritonavir tablet (100mg) together twice daily for 5 days., Normal, Disp-30 tablet, R-0

## 2024-01-06 NOTE — DISCHARGE INSTRUCTIONS
Call your primary care provider in 2 days to make an appointment for follow-up.  You will need to have your blood work repeated.    Take the antiviral as directed.  While you are on this medication stop your atorvastatin.  The atorvastatin should be held for 10 days.  5 days after you complete the antiviral medication you can resume the atorvastatin.    Stay away from people for 5 full days starting from your first full days of symptoms.  Strict indoor masking for the 5 days following quarantine.   Over-the-counter cough and cold medication as needed for symptom management.   If you develop fever that lasts longer than 5 days, chest pain or shortness of breath go to the ER.    Follow up with your primary doctor.     If you have new, changing or worsening symptoms, please go directly to the ER.

## 2024-01-08 ENCOUNTER — TELEPHONE (OUTPATIENT)
Dept: INTERNAL MEDICINE CLINIC | Facility: CLINIC | Age: 54
End: 2024-01-08

## 2024-01-08 DIAGNOSIS — R74.8 ELEVATED LIVER ENZYMES: Primary | ICD-10-CM

## 2024-01-08 LAB — HEMOGLOBIN A1C: 6.9 % (ref 4.3–5.6)

## 2024-01-08 NOTE — TELEPHONE ENCOUNTER
Please call patient that we were notified by the ER that his liver enzymes were high. I would like him to repeat blood work in 2 weeks when he is feeling better. Also he is due for physical in February

## 2024-01-08 NOTE — TELEPHONE ENCOUNTER
1/6/2024  Karli Mcgill PA-C [place], Laura, MD  Hi Dr. Dinh,    I saw your patient Irving Fuentes today at the [facility] emergency department.  He came in for respiratory symptoms after known COVID exposure.  He was COVID-positive here today.  He had mentioned his urine was a dark color.  Urinalysis showed wilda color urine with ketones and urobilinogen.  For this reason lab work was performed.  He had some new elevation of his LFTs.  Was noted to have thrombocytopenia and a low white count on today's lab work.  I recommended he follow with you as an outpatient to have repeat blood work done.  If you could accommodate a follow-up appointment for this patient with repeat blood work that would be greatly appreciated.  If you have any questions please feel free to reach out, otherwise consider this an FYI email.    MARIBEL Rasheed ED

## 2024-01-10 NOTE — TELEPHONE ENCOUNTER
[phone]. Encouraged pt to call office back if he has questions. Left cb#.     MCM sent.

## 2024-05-01 ENCOUNTER — HOSPITAL ENCOUNTER (INPATIENT)
Facility: HOSPITAL | Age: 54
LOS: 2 days | Discharge: HOME | DRG: 872 | End: 2024-05-03
Attending: EMERGENCY MEDICINE | Admitting: HOSPITALIST
Payer: COMMERCIAL

## 2024-05-01 ENCOUNTER — HOSPITAL ENCOUNTER (INPATIENT)
Facility: HOSPITAL | Age: 54
LOS: 2 days | Discharge: HOME | End: 2024-05-03
Attending: EMERGENCY MEDICINE | Admitting: HOSPITALIST
Payer: COMMERCIAL

## 2024-05-01 DIAGNOSIS — N39.0 SEPSIS DUE TO URINARY TRACT INFECTION (HCC): Primary | ICD-10-CM

## 2024-05-01 DIAGNOSIS — E10.9 TYPE 1 DIABETES MELLITUS WITHOUT COMPLICATION (HCC): ICD-10-CM

## 2024-05-01 DIAGNOSIS — A41.9 SEPSIS DUE TO URINARY TRACT INFECTION (HCC): Primary | ICD-10-CM

## 2024-05-01 PROBLEM — R73.9 HYPERGLYCEMIA: Status: ACTIVE | Noted: 2024-05-01

## 2024-05-01 PROBLEM — E87.1 HYPONATREMIA: Status: ACTIVE | Noted: 2024-05-01

## 2024-05-01 PROBLEM — D69.6 THROMBOCYTOPENIA: Status: ACTIVE | Noted: 2024-05-01

## 2024-05-01 PROBLEM — D69.6 THROMBOCYTOPENIA (HCC): Status: ACTIVE | Noted: 2024-05-01

## 2024-05-01 LAB
ALBUMIN SERPL-MCNC: 3.7 G/DL (ref 3.4–5)
ALBUMIN/GLOB SERPL: 0.9 {RATIO} (ref 1–2)
ALP LIVER SERPL-CCNC: 136 U/L
ALT SERPL-CCNC: 48 U/L
ANION GAP SERPL CALC-SCNC: 9 MMOL/L (ref 0–18)
AST SERPL-CCNC: 75 U/L (ref 15–37)
BASOPHILS # BLD AUTO: 0.04 X10(3) UL (ref 0–0.2)
BASOPHILS NFR BLD AUTO: 0.3 %
BILIRUB SERPL-MCNC: 4.3 MG/DL (ref 0.1–2)
BUN BLD-MCNC: 12 MG/DL (ref 9–23)
CALCIUM BLD-MCNC: 9.3 MG/DL (ref 8.5–10.1)
CHLORIDE SERPL-SCNC: 100 MMOL/L (ref 98–112)
CO2 SERPL-SCNC: 24 MMOL/L (ref 21–32)
COLOR UR AUTO: YELLOW
CREAT BLD-MCNC: 0.96 MG/DL
EGFRCR SERPLBLD CKD-EPI 2021: 95 ML/MIN/1.73M2 (ref 60–?)
EOSINOPHIL # BLD AUTO: 0.01 X10(3) UL (ref 0–0.7)
EOSINOPHIL NFR BLD AUTO: 0.1 %
ERYTHROCYTE [DISTWIDTH] IN BLOOD BY AUTOMATED COUNT: 12.6 %
GLOBULIN PLAS-MCNC: 4.3 G/DL (ref 2.8–4.4)
GLUCOSE BLD-MCNC: 101 MG/DL (ref 70–99)
GLUCOSE BLD-MCNC: 127 MG/DL (ref 70–99)
GLUCOSE BLD-MCNC: 89 MG/DL (ref 70–99)
GLUCOSE UR STRIP.AUTO-MCNC: NEGATIVE MG/DL
HCT VFR BLD AUTO: 41.9 %
HGB BLD-MCNC: 14.3 G/DL
IMM GRANULOCYTES # BLD AUTO: 0.06 X10(3) UL (ref 0–1)
IMM GRANULOCYTES NFR BLD: 0.5 %
KETONES UR STRIP.AUTO-MCNC: 40 MG/DL
LACTATE SERPL-SCNC: 3.1 MMOL/L (ref 0.4–2)
LYMPHOCYTES # BLD AUTO: 0.71 X10(3) UL (ref 1–4)
LYMPHOCYTES NFR BLD AUTO: 5.4 %
MCH RBC QN AUTO: 32.9 PG (ref 26–34)
MCHC RBC AUTO-ENTMCNC: 34.1 G/DL (ref 31–37)
MCV RBC AUTO: 96.5 FL
MONOCYTES # BLD AUTO: 0.86 X10(3) UL (ref 0.1–1)
MONOCYTES NFR BLD AUTO: 6.5 %
NEUTROPHILS # BLD AUTO: 11.46 X10 (3) UL (ref 1.5–7.7)
NEUTROPHILS # BLD AUTO: 11.46 X10(3) UL (ref 1.5–7.7)
NEUTROPHILS NFR BLD AUTO: 87.2 %
NITRITE UR QL STRIP.AUTO: POSITIVE
OSMOLALITY SERPL CALC.SUM OF ELEC: 277 MOSM/KG (ref 275–295)
PH UR STRIP.AUTO: 5.5 [PH] (ref 5–8)
PLATELET # BLD AUTO: 121 10(3)UL (ref 150–450)
POTASSIUM SERPL-SCNC: 4.1 MMOL/L (ref 3.5–5.1)
PROT SERPL-MCNC: 8 G/DL (ref 6.4–8.2)
PROT UR STRIP.AUTO-MCNC: >=300 MG/DL
RBC # BLD AUTO: 4.34 X10(6)UL
SODIUM SERPL-SCNC: 133 MMOL/L (ref 136–145)
SP GR UR STRIP.AUTO: >=1.03 (ref 1–1.03)
UROBILINOGEN UR STRIP.AUTO-MCNC: 1 MG/DL
WBC # BLD AUTO: 13.1 X10(3) UL (ref 4–11)
WBC #/AREA URNS AUTO: >50 /HPF

## 2024-05-01 RX ORDER — SODIUM CHLORIDE 9 MG/ML
INJECTION, SOLUTION INTRAVENOUS CONTINUOUS
Status: ACTIVE | OUTPATIENT
Start: 2024-05-01 | End: 2024-05-02

## 2024-05-01 RX ORDER — ACETAMINOPHEN 500 MG
1000 TABLET ORAL ONCE
Status: COMPLETED | OUTPATIENT
Start: 2024-05-01 | End: 2024-05-01

## 2024-05-01 RX ORDER — IBUPROFEN 600 MG/1
600 TABLET ORAL ONCE
Status: COMPLETED | OUTPATIENT
Start: 2024-05-01 | End: 2024-05-01

## 2024-05-01 NOTE — ED INITIAL ASSESSMENT (HPI)
Pt states urinary urgency, frequency,  and pain with urination. Symptoms started today. Denies fever.

## 2024-05-02 ENCOUNTER — APPOINTMENT (OUTPATIENT)
Dept: CT IMAGING | Facility: HOSPITAL | Age: 54
DRG: 872 | End: 2024-05-02
Attending: INTERNAL MEDICINE
Payer: COMMERCIAL

## 2024-05-02 ENCOUNTER — APPOINTMENT (OUTPATIENT)
Dept: CT IMAGING | Facility: HOSPITAL | Age: 54
End: 2024-05-02
Attending: INTERNAL MEDICINE
Payer: COMMERCIAL

## 2024-05-02 LAB
EST. AVERAGE GLUCOSE BLD GHB EST-MCNC: 151 MG/DL (ref 68–126)
GLUCOSE BLD-MCNC: 153 MG/DL (ref 70–99)
GLUCOSE BLD-MCNC: 167 MG/DL (ref 70–99)
GLUCOSE BLD-MCNC: 173 MG/DL (ref 70–99)
GLUCOSE BLD-MCNC: 90 MG/DL (ref 70–99)
HBA1C MFR BLD: 6.9 % (ref ?–5.7)
LACTATE SERPL-SCNC: 1.8 MMOL/L (ref 0.4–2)

## 2024-05-02 PROCEDURE — 74176 CT ABD & PELVIS W/O CONTRAST: CPT | Performed by: INTERNAL MEDICINE

## 2024-05-02 PROCEDURE — 99223 1ST HOSP IP/OBS HIGH 75: CPT | Performed by: HOSPITALIST

## 2024-05-02 RX ORDER — NICOTINE POLACRILEX 4 MG
15 LOZENGE BUCCAL
Status: DISCONTINUED | OUTPATIENT
Start: 2024-05-02 | End: 2024-05-03

## 2024-05-02 RX ORDER — POLYETHYLENE GLYCOL 3350 17 G/17G
17 POWDER, FOR SOLUTION ORAL DAILY PRN
Status: DISCONTINUED | OUTPATIENT
Start: 2024-05-02 | End: 2024-05-03

## 2024-05-02 RX ORDER — ONDANSETRON 2 MG/ML
4 INJECTION INTRAMUSCULAR; INTRAVENOUS EVERY 6 HOURS PRN
Status: DISCONTINUED | OUTPATIENT
Start: 2024-05-02 | End: 2024-05-03

## 2024-05-02 RX ORDER — DEXTROSE MONOHYDRATE 25 G/50ML
50 INJECTION, SOLUTION INTRAVENOUS
Status: DISCONTINUED | OUTPATIENT
Start: 2024-05-02 | End: 2024-05-03

## 2024-05-02 RX ORDER — ATORVASTATIN CALCIUM 10 MG/1
10 TABLET, FILM COATED ORAL NIGHTLY
Status: DISCONTINUED | OUTPATIENT
Start: 2024-05-02 | End: 2024-05-03

## 2024-05-02 RX ORDER — BISACODYL 10 MG
10 SUPPOSITORY, RECTAL RECTAL
Status: DISCONTINUED | OUTPATIENT
Start: 2024-05-02 | End: 2024-05-03

## 2024-05-02 RX ORDER — CEFADROXIL 500 MG/1
1 CAPSULE ORAL 2 TIMES DAILY
Qty: 48 CAPSULE | Refills: 0 | Status: SHIPPED | OUTPATIENT
Start: 2024-05-02 | End: 2024-05-03

## 2024-05-02 RX ORDER — NICOTINE POLACRILEX 4 MG
30 LOZENGE BUCCAL
Status: DISCONTINUED | OUTPATIENT
Start: 2024-05-02 | End: 2024-05-03

## 2024-05-02 RX ORDER — SENNOSIDES 8.6 MG
17.2 TABLET ORAL NIGHTLY PRN
Status: DISCONTINUED | OUTPATIENT
Start: 2024-05-02 | End: 2024-05-03

## 2024-05-02 RX ORDER — CEFAZOLIN SODIUM/WATER 2 G/20 ML
2 SYRINGE (ML) INTRAVENOUS EVERY 8 HOURS
Status: DISCONTINUED | OUTPATIENT
Start: 2024-05-02 | End: 2024-05-02

## 2024-05-02 RX ORDER — MELATONIN
3 NIGHTLY PRN
Status: DISCONTINUED | OUTPATIENT
Start: 2024-05-02 | End: 2024-05-03

## 2024-05-02 RX ORDER — PROCHLORPERAZINE EDISYLATE 5 MG/ML
5 INJECTION INTRAMUSCULAR; INTRAVENOUS EVERY 8 HOURS PRN
Status: DISCONTINUED | OUTPATIENT
Start: 2024-05-02 | End: 2024-05-03

## 2024-05-02 RX ORDER — ENEMA 19; 7 G/133ML; G/133ML
1 ENEMA RECTAL ONCE AS NEEDED
Status: DISCONTINUED | OUTPATIENT
Start: 2024-05-02 | End: 2024-05-03

## 2024-05-02 RX ORDER — ACETAMINOPHEN 500 MG
500 TABLET ORAL EVERY 4 HOURS PRN
Status: DISCONTINUED | OUTPATIENT
Start: 2024-05-02 | End: 2024-05-03

## 2024-05-02 RX ORDER — SODIUM CHLORIDE 9 MG/ML
INJECTION, SOLUTION INTRAVENOUS CONTINUOUS
Status: ACTIVE | OUTPATIENT
Start: 2024-05-02 | End: 2024-05-03

## 2024-05-02 RX ORDER — LISINOPRIL 10 MG/1
10 TABLET ORAL DAILY
Status: DISCONTINUED | OUTPATIENT
Start: 2024-05-02 | End: 2024-05-03

## 2024-05-02 RX ORDER — ENOXAPARIN SODIUM 100 MG/ML
40 INJECTION SUBCUTANEOUS DAILY
Status: DISCONTINUED | OUTPATIENT
Start: 2024-05-02 | End: 2024-05-03

## 2024-05-02 NOTE — H&P
[facility]  [facility]     Leobardo Fuentes Patient Status:  Inpatient    1970 MRN [id number]   [facility] 3NE-A Attending Pepe Garcia,    Hosp Day # 1 PCP Matilde Dinh MD     Chief Complaint: Urinary frequency    Subjective:    History of Present Illness:     Leobardo Fuentes is a 53 year old male with history of type 1 diabetes on insulin pump, hypertension, hyperlipidemia presents to the emergency room with dysuria and polyuria that started today patient only urinating small amounts of urine at a time and has had increased urge to urinate.  Patient then later developed fever in the emergency room.  No chills, nausea, vomiting.  No chest pain, shortness of breath.  No gross hematuria, hematochezia, melena.    History/Other:    Past Medical History:  Past Medical History:    Abdominal hernia    Anxiety    Blurred vision    Decorative tattoo    Diabetes mellitus (HCC)    Essential hypertension    Heartburn    High cholesterol    Stress    Type 1 diabetes mellitus (HCC)    Wears glasses     Past Surgical History:   Past Surgical History:   Procedure Laterality Date    Anesth,surgery of shoulder      Other      Other      shoulder - left    Other surgical history      Knee surgery, ACL     Other surgical history      R Leg, Unspecified treatment 2 rods placed       Family History:   Family History   Problem Relation Age of Onset    Diabetes Maternal Aunt     Cancer Paternal Grandfather         Colon    Colon Cancer Paternal Grandfather     Dementia Maternal Grandmother     Cancer Paternal Grandmother         Leukemia    Dementia Maternal Grandfather     Diabetes Father     Diabetes Other      Social History:    reports that he has never smoked. He has never used smokeless tobacco. He reports that he does not currently use alcohol. He reports that he does not use drugs.     Allergies:   Allergies   Allergen Reactions    Succinylcholine      Delayed awakening from  surgery in 1988       Medications:    No current facility-administered medications on file prior to encounter.     Current Outpatient Medications on File Prior to Encounter   Medication Sig Dispense Refill    Insulin Disposable Pump (OMNIPOD 5 G6 POD, GEN 5,) Does not apply Misc USE 1 every 3 (three) days.      insulin aspart 100 Units/mL Injection Solution Up to 60 units daily via insulin pump      lisinopril 10 MG Oral Tab Take 1 tablet (10 mg total) by mouth daily.      atorvastatin 10 MG Oral Tab Take 1 tablet (10 mg total) by mouth daily. (Patient taking differently: Take 1 tablet (10 mg total) by mouth nightly.) 90 tablet 3    PEG 3350-KCl-Na Bicarb-NaCl 420 g Oral Recon Soln Take as directed by physician 4000 mL 0    Glucose Blood In Vitro Strip Test 4 times daily 400 strip 1    Kalen Microlet Lancets Does not apply Misc Test 4 times daily 400 each 3    Continuous Blood Gluc  (DEXCOM G6 ) Does not apply Device 1 each by Does not apply route daily. 1 Device 3    Continuous Blood Gluc Sensor (DEXCOM G6 SENSOR) Does not apply Misc 1 each by Does not apply route See Admin Instructions. Every 10 days 9 each 3    Continuous Blood Gluc Transmit (DEXCOM G6 TRANSMITTER) Does not apply Misc 1 each by Does not apply route See Admin Instructions. Every 90 days 3 each 3    Acetone, Urine, Test (KETOSTIX) In Vitro Strip Test urine for ketones for BG >250 mg/dL 25 strip prn       Review of Systems:   A comprehensive review of systems was completed.    Pertinent positives and negatives noted in the HPI.    Objective:   Physical Exam:    /69 (BP Location: Left arm)   Pulse 110   Temp 99.8 °F (37.7 °C) (Temporal)   Resp 20   Ht 5' 10\" (1.778 m)   Wt 212 lb (96.2 kg)   SpO2 94%   BMI 30.42 kg/m²   General: No acute distress, Alert  Respiratory: No rhonchi, no wheezes  Cardiovascular: S1, S2. Regular rate and rhythm  Abdomen: Soft, Non-tender, non-distended, positive bowel sounds  Neuro: No new focal  deficits  Extremities: No edema      Results:    Labs:      Labs Last 24 Hours:    Recent Labs   Lab 05/01/24 1938   RBC 4.34   HGB 14.3   HCT 41.9   MCV 96.5   MCH 32.9   MCHC 34.1   RDW 12.6   NEPRELIM 11.46*   WBC 13.1*   .0*       Recent Labs   Lab 05/01/24 1938   *   BUN 12   CREATSERUM 0.96   EGFRCR 95   CA 9.3   ALB 3.7   *   K 4.1      CO2 24.0   ALKPHO 136*   AST 75*   ALT 48   BILT 4.3*   TP 8.0       No results found for: \"PT\", \"INR\"    No results for input(s): \"TROP\", \"TROPHS\", \"CK\" in the last 168 hours.    No results for input(s): \"TROP\", \"PBNP\" in the last 168 hours.    No results for input(s): \"PCT\" in the last 168 hours.    Imaging: Imaging data reviewed in Epic.    Assessment & Plan:      #Sepsis  -Lactic acidosis, leukocytosis fever, tachycardia  - Secondary to UTI  -Continue IV fluids per sepsis protocol  -Continue ceftriaxone  -Cultures pending    # UTI  -Will continue ceftriaxone  -Urine cultures pending    # Type 2 diabetes  -Patient is on insulin    # Hypertension   -will continue on lisinopril    # Hyperlipidemia  -Will continue on statin therapy      Plan of care discussed with patient at bedside.    Wing Ding, DO    Supplementary Documentation:     The 21st Century Cures Act makes medical notes like these available to patients in the interest of transparency. Please be advised this is a medical document. Medical documents are intended to carry relevant information, facts as evident, and the clinical opinion of the practitioner. The medical note is intended as peer to peer communication and may appear blunt or direct. It is written in medical language and may contain abbreviations or verbiage that are unfamiliar.

## 2024-05-02 NOTE — ED QUICK NOTES
Orders for admission, patient is aware of plan and ready to go upstairs. Any questions, please call ED MELISSA Fam at extension [phone].     Patient Covid vaccination status: Fully vaccinated     COVID Test Ordered in ED: None    COVID Suspicion at Admission: N/A    Running Infusions:  0.9% NS at 999mL/hr    Mental Status/LOC at time of transport: A&Ox4    Other pertinent information: N/A  CIWA score: N/A   NIH score:  N/A

## 2024-05-02 NOTE — PAYOR COMM NOTE
--------------  ADMISSION REVIEW     Payor: [employer]  Subscriber #:  [id number]  Authorization Number: [id number]    Admit date: 5/1/24  Admit time: 11:28 PM       REVIEW DOCUMENTATION:     ED Provider Notes            Stated Complaint: urinary symptoms    Subjective:   HPI    Patient is a 53-year-old gentleman, well-controlled type I diabetic on a pump, who presents with urgency frequency and pain on urination which started today.  Small amount of urine at a time.  Urged to urinate.  Developed a fever of 101 here.  No history of prostate problems.  No chest pain.  No headaches.  No other specific complaints.  Patient is otherwise at his medical baseline.    Physical Exam     ED Triage Vitals [05/01/24 1809]   /66   Pulse 110   Resp 16   Temp 99.5 °F (37.5 °C)   Temp src Oral   SpO2 95 %   O2 Device None (Room air)         Physical Exam    General: Patient is resting comfortably in no acute distress  HEENT: Normal cephalic atraumatic.  Nonicteric sclera.  Mildly dry mucous membranes.  No meningismus.  No adenopathy  Lungs: No tachypnea.  Lungs clear to auscultation bilaterally without rales/rhonchi.  Equal breath sounds bilaterally  Cardiac: Mild tachycardia. Abdomen: Soft and nontender throughout.  No rebound or guarding  Extremities: No clubbing/cyanosis/edema.  Skin: No rashes, no pallor  Neuro: Awake oriented ×3.  Nonfocal.  Good strength throughout    ED Course     Labs Reviewed   URINALYSIS WITH CULTURE REFLEX - Abnormal; Notable for the following components:       Result Value    Clarity Urine Turbid (*)     Bilirubin Urine Moderate (*)     Ketones Urine 40 (*)     Blood Urine Large (*)     Protein Urine >=300 (*)     Urobilinogen Urine 1.0 (*)     Nitrite Urine Positive (*)     Leukocyte Esterase Urine Large (*)     All other components within normal limits   UA MICROSCOPIC ONLY, URINE - Abnormal; Notable for the following components:    WBC Urine >50 (*)     Bacteria Urine 3+  (*)     Squamous Epi. Cells Few (*)     All other components within normal limits   COMP METABOLIC PANEL (14) - Abnormal; Notable for the following components:    Glucose 127 (*)     Sodium 133 (*)     AST 75 (*)     Alkaline Phosphatase 136 (*)     Bilirubin, Total 4.3 (*)     A/G Ratio 0.9 (*)     All other components within normal limits   LACTIC ACID, PLASMA - Abnormal; Notable for the following components:    Lactic Acid 3.1 (*)     All other components within normal limits   CBC W/ DIFFERENTIAL - Abnormal; Notable for the following components:    WBC 13.1 (*)     .0 (*)     Neutrophil Absolute Prelim 11.46 (*)     Neutrophil Absolute 11.46 (*)     Lymphocyte Absolute 0.71 (*)     All other components within normal limits   CBC WITH DIFFERENTIAL WITH PLATELET    Narrative:      Lactic acid is elevated at 3.1.  Sodium 133.  Glucose 127.  Bicarb normal.  Bilirubin is elevated at 4.3.  No right upper quadrant pain.  White count 13,000.    Urine greater than 50 WBCs.    MDM      Patient presents with dysuria frequency urgency.  Chills and fevers on arrival here.  IV was placed.  Blood cultures lactate performed.  Was started with a liter normal saline which was changed to 30/kg.  Patient meets criteria for sepsis given his elevated bilirubin and his high lactate.  Was given IV Rocephin.  [facility]  Will admit to monitored bed for further IV fluids, IV antibiotics, further care and treatment.  Admission disposition: 5/1/2024  8:24 PM             Sepsis Reassessment Note    /68   Pulse 109   Temp (!) 101.1 °F (38.4 °C) (Oral)   Resp 15   Ht 177.8 cm (5' 10\")   Wt 90.7 kg   SpO2 94%   BMI 28.70 kg/m²      I completed the sepsis reassessment at 8:30 PM    Cardiac:  Regularity: Regular  Rate: Tachycardic  Heart Sounds: S1,S2    Lungs:   Right: Clear  Left: Clear    Peripheral Pulses:  Radial: Right 1+ or Left 1+      Capillary Refill:  <3 Secs    Skin:  Temp/Moisture:  Warm and Dry  Color: Normal      Paras Ramirez MD  2024  8:28 PM      Medical Decision Making      Disposition and Plan     Clinical Impression:  1. Sepsis due to urinary tract infection (HCC)    2. Type 1 diabetes mellitus without complication (HCC)         Disposition:  Admit  2024  8:24 pm         Hospital Problems       Present on Admission  Date Reviewed: 2/15/2023            ICD-10-CM Noted POA    * (Principal) Sepsis due to urinary tract infection (HCC) A41.9, N39.0 2024 Unknown    Hyperglycemia R73.9 2024 Yes    Hyponatremia E87.1 2024 Yes    Thrombocytopenia (HCC) D69.6 2024 Yes           History and Physical            Leobardo Fuentes Patient Status:  Inpatient    1970 MRN [id number]   [facility] 3NE-A Attending Pepe Garcia,    Hosp Day # 1 PCP Matilde Dinh MD      Chief Complaint: Urinary frequency        Subjective:  History of Present Illness:      Leobardo Fuentes is a 53 year old male with history of type 1 diabetes on insulin pump, hypertension, hyperlipidemia presents to the emergency room with dysuria and polyuria that started today patient only urinating small amounts of urine at a time and has had increased urge to urinate.  Patient then later developed fever in the emergency room.  No chills, nausea, vomiting.  No chest pain, shortness of breath.  No gross hematuria, hematochezia, melena.         Assessment & Plan:  #Sepsis  -Lactic acidosis, leukocytosis fever, tachycardia  - Secondary to UTI  -Continue IV fluids per sepsis protocol  -Continue ceftriaxone  -Cultures pending     # UTI  -Will continue ceftriaxone  -Urine cultures pending     # Type 2 diabetes  -Patient is on insulin     # Hypertension   -will continue on lisinopril     # Hyperlipidemia  -Will continue on statin therapy        Plan of care discussed with patient at bedside.     Wing Ding,         MEDICATIONS ADMINISTERED IN LAST 1 DAY:  acetaminophen  (Tylenol Extra Strength) tab 1,000 mg       Date Action Dose Route User    5/1/2024 1956 Given 1,000 mg Oral Cristel Mcconnell RN          ceFAZolin (Ancef) 2 g in 20mL IV syringe premix       Date Action Dose Route User    5/2/2024 1154 Given 2 g Intravenous Omi Guillory RN          cefTRIAXone (Rocephin) 1 g in D5W 100 mL IVPB-ADD       Date Action Dose Route User    5/1/2024 1956 New Bag 1 g Intravenous Cristel Mcconnell RN          enoxaparin (Lovenox) 40 MG/0.4ML SUBQ injection 40 mg       Date Action Dose Route User    5/2/2024 0906 Given 40 mg Subcutaneous (Left Lower Abdomen) Omi Guillory RN          ibuprofen (Motrin) tab 600 mg       Date Action Dose Route User    5/1/2024 2141 Given 600 mg Oral Cristel Mcconnell RN          insulin via Insulin Pump       Date Action Dose Route User    5/2/2024 1100 Self Administered via Pump 2 Units/hr Subcutaneous (Left Lower Abdomen) Omi Guillory RN          lisinopril (Zestril) tab 10 mg       Date Action Dose Route User    5/2/2024 0906 Given 10 mg Oral Omi Guillory RN          sodium chloride 0.9% infusion       Date Action Dose Route User    5/2/2024 1154 New Bag (none) Intravenous Omi Guillory RN          sodium chloride 0.9 % IV bolus 1,000 mL       Date Action Dose Route User    5/1/2024 1956 New Bag 1,000 mL Intravenous Cristel Mcconnell RN          sodium chloride 0.9 % IV bolus 2,721 mL       Date Action Dose Route User    5/1/2024 2111 New Bag 1,721 mL Intravenous Cristel Mcconnell RN            Vitals (last day)       Date/Time Temp Pulse Resp BP SpO2 Weight O2 Device O2 Flow Rate (L/min) [facility]    05/02/24 1145 98.7 °F (37.1 °C) 101 18 105/62 96 % -- -- -- WW    05/02/24 0917 99.2 °F (37.3 °C) 97 18 128/95 95 % -- -- -- WW    05/02/24 0409 97.9 °F (36.6 °C) 99 20 107/66 -- -- -- -- DT    05/01/24 2330 -- -- -- -- -- 212 lb -- -- DT    05/01/24 2318 99.8 °F (37.7 °C) 110 20 130/69 94 % 212 lb 8.4 oz None (Room air) -- JM     05/01/24 2233 100.8 °F (38.2 °C) 112 20 151/85 95 % -- None (Room air) --     05/01/24 2205 -- 108 20 140/70 94 % -- None (Room air) --     05/01/24 2110 102.5 °F (39.2 °C) 106 17 143/72 94 % -- None (Room air) --     05/01/24 2000 -- 109 15 148/68 94 % -- None (Room air) --     05/01/24 1940 101.1 °F (38.4 °C) -- -- -- -- -- -- --     05/01/24 1809 99.5 °F (37.5 °C) 110 16 123/66 95 % 200 lb None (Room air) -- JW

## 2024-05-02 NOTE — ED PROVIDER NOTES
Patient Seen in: [facility] [place]      History     Chief Complaint   Patient presents with    Urinary Symptoms     Stated Complaint: urinary symptoms    Subjective:   HPI    Patient is a 53-year-old gentleman, well-controlled type I diabetic on a pump, who presents with urgency frequency and pain on urination which started today.  Small amount of urine at a time.  Urged to urinate.  Developed a fever of 101 here.  No history of prostate problems.  No chest pain.  No headaches.  No other specific complaints.  Patient is otherwise at his medical baseline.    Objective:   Past Medical History:    Abdominal hernia    Anxiety    Blurred vision    Decorative tattoo    Diabetes mellitus (HCC)    Essential hypertension    Heartburn    High cholesterol    Stress    Type 1 diabetes mellitus (HCC)    Wears glasses              Past Surgical History:   Procedure Laterality Date    Anesth,surgery of shoulder      Other      Other      shoulder - left    Other surgical history      Knee surgery, ACL 2001    Other surgical history      R Leg, Unspecified treatment 2 rods placed 1988                Social History     Socioeconomic History    Marital status:    Tobacco Use    Smoking status: Never    Smokeless tobacco: Never   Vaping Use    Vaping status: Never Used   Substance and Sexual Activity    Alcohol use: Not Currently     Comment: 1-3 glasses of scotch daily    Drug use: No   Other Topics Concern    Caffeine Concern Yes     Comment: Daily; 1-2 cups     Exercise Yes     Comment: 4 times a week     Seat Belt Yes              Review of Systems    Positive for stated complaint: urinary symptoms  Other systems are as noted in HPI.  Constitutional and vital signs reviewed.      All other systems reviewed and negative except as noted above.    Physical Exam     ED Triage Vitals [05/01/24 1809]   /66   Pulse 110   Resp 16   Temp 99.5 °F (37.5 °C)   Temp src Oral   SpO2 95 %   O2 Device None  (Room air)       Current:/68   Pulse 109   Temp (!) 101.1 °F (38.4 °C) (Oral)   Resp 15   Ht 177.8 cm (5' 10\")   Wt 90.7 kg   SpO2 94%   BMI 28.70 kg/m²         Physical Exam    General: Patient is resting comfortably in no acute distress  HEENT: Normal cephalic atraumatic.  Nonicteric sclera.  Mildly dry mucous membranes.  No meningismus.  No adenopathy  Lungs: No tachypnea.  Lungs clear to auscultation bilaterally without rales/rhonchi.  Equal breath sounds bilaterally  Cardiac: Mild tachycardia. Abdomen: Soft and nontender throughout.  No rebound or guarding  Extremities: No clubbing/cyanosis/edema.  Skin: No rashes, no pallor  Neuro: Awake oriented ×3.  Nonfocal.  Good strength throughout    ED Course     Labs Reviewed   URINALYSIS WITH CULTURE REFLEX - Abnormal; Notable for the following components:       Result Value    Clarity Urine Turbid (*)     Bilirubin Urine Moderate (*)     Ketones Urine 40 (*)     Blood Urine Large (*)     Protein Urine >=300 (*)     Urobilinogen Urine 1.0 (*)     Nitrite Urine Positive (*)     Leukocyte Esterase Urine Large (*)     All other components within normal limits   UA MICROSCOPIC ONLY, URINE - Abnormal; Notable for the following components:    WBC Urine >50 (*)     Bacteria Urine 3+ (*)     Squamous Epi. Cells Few (*)     All other components within normal limits   COMP METABOLIC PANEL (14) - Abnormal; Notable for the following components:    Glucose 127 (*)     Sodium 133 (*)     AST 75 (*)     Alkaline Phosphatase 136 (*)     Bilirubin, Total 4.3 (*)     A/G Ratio 0.9 (*)     All other components within normal limits   LACTIC ACID, PLASMA - Abnormal; Notable for the following components:    Lactic Acid 3.1 (*)     All other components within normal limits   CBC W/ DIFFERENTIAL - Abnormal; Notable for the following components:    WBC 13.1 (*)     .0 (*)     Neutrophil Absolute Prelim 11.46 (*)     Neutrophil Absolute 11.46 (*)     Lymphocyte Absolute  0.71 (*)     All other components within normal limits   CBC WITH DIFFERENTIAL WITH PLATELET    Narrative:     The following orders were created for panel order CBC With Differential With Platelet.  Procedure                               Abnormality         Status                     ---------                               -----------         ------                     CBC W/ DIFFERENTIAL[[id number]]          Abnormal            Final result                 Please view results for these tests on the individual orders.   LACTIC ACID REFLEX POST POSTIVE   URINE CULTURE, ROUTINE   BLOOD CULTURE   BLOOD CULTURE             Lactic acid is elevated at 3.1.  Sodium 133.  Glucose 127.  Bicarb normal.  Bilirubin is elevated at 4.3.  No right upper quadrant pain.  White count 13,000.     Urine greater than 50 WBCs.    MDM      Patient presents with dysuria frequency urgency.  Chills and fevers on arrival here.  IV was placed.  Blood cultures lactate performed.  Was started with a liter normal saline which was changed to 30/kg.  Patient meets criteria for sepsis given his elevated bilirubin and his high lactate.  Was given IV Rocephin.  Discussed with the [facility].  Will admit to monitored bed for further IV fluids, IV antibiotics, further care and treatment.  Admission disposition: 5/1/2024  8:24 PM                                     [facility] [place]      Sepsis Reassessment Note    /68   Pulse 109   Temp (!) 101.1 °F (38.4 °C) (Oral)   Resp 15   Ht 177.8 cm (5' 10\")   Wt 90.7 kg   SpO2 94%   BMI 28.70 kg/m²      I completed the sepsis reassessment at 8:30 PM    Cardiac:  Regularity: Regular  Rate: Tachycardic  Heart Sounds: S1,S2    Lungs:   Right: Clear  Left: Clear    Peripheral Pulses:  Radial: Right 1+ or Left 1+      Capillary Refill:  <3 Secs    Skin:  Temp/Moisture: Warm and Dry  Color: Normal      Paras Ramirez MD  5/1/2024  8:28 PM            Medical Decision  Making      Disposition and Plan     Clinical Impression:  1. Sepsis due to urinary tract infection (HCC)    2. Type 1 diabetes mellitus without complication (HCC)         Disposition:  Admit  5/1/2024  8:24 pm    Follow-up:  No follow-up provider specified.        Medications Prescribed:  Current Discharge Medication List                            Hospital Problems       Present on Admission  Date Reviewed: 2/15/2023            ICD-10-CM Noted POA    * (Principal) Sepsis due to urinary tract infection (HCC) A41.9, N39.0 5/1/2024 Unknown    Hyperglycemia R73.9 5/1/2024 Yes    Hyponatremia E87.1 5/1/2024 Yes    Thrombocytopenia (HCC) D69.6 5/1/2024 Yes

## 2024-05-02 NOTE — CONSULTS
[facility], [facility]  TEL: [phone]  FAX: [phone]    Leobardo Fuentes Patient Status:  Inpatient    1970 MRN [id number]   [facility] 3NE-A Attending Dillan Sales, DO   Hosp Day # 1 PCP Matilde Dinh MD     Reason for Consultation:  Bacteremic UTI    History of Present Illness:  Leobardo Fuentes is a a(n) 53 year old male.  With a history of fairly well-controlled diabetes.  Hemoglobin A1c 6.9.  On insulin pump.  Presenting with sudden onset of shaking chills.  Patient was doing some shopping for graduation party for his son from [facility] when he started developing some discomfort with the feeling that he needed to urinate.  He stopped his [employer].  Very small amount of urine.  On the way home, he had developed some chills.  Eventually went to the [place] ED.  There he was able to urinate but he spiked a fever to 102.  Blood cultures were drawn.  Growing Klebsiella oxytoca.  With pyuria on urinalysis.  He was sent to the ED at [place].  Started on Ancef.  He feels much better today.  No fevers today so far.  No flank pain.  No dysuria presently.    He has not had any history of UTI in the past.  No repeated antibiotic exposure.    History:  Past Medical History:    Abdominal hernia    Anxiety    Blurred vision    Decorative tattoo    Diabetes mellitus (HCC)    Essential hypertension    Heartburn    High cholesterol    Stress    Type 1 diabetes mellitus (HCC)    Wears glasses     Past Surgical History:   Procedure Laterality Date    Anesth,surgery of shoulder      Other      Other      shoulder - left    Other surgical history      Knee surgery, ACL     Other surgical history      R Leg, Unspecified treatment 2 rods placed      Family History   Problem Relation Age of Onset    Diabetes Maternal Aunt     Cancer Paternal Grandfather         Colon    Colon Cancer Paternal Grandfather     Dementia Maternal Grandmother     Cancer Paternal  Grandmother         Leukemia    Dementia Maternal Grandfather     Diabetes Father     Diabetes Other       reports that he has never smoked. He has never used smokeless tobacco. He reports that he does not currently use alcohol. He reports that he does not use drugs.    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       Medications:    Current Facility-Administered Medications:     atorvastatin (Lipitor) tab 10 mg, 10 mg, Oral, Nightly    lisinopril (Zestril) tab 10 mg, 10 mg, Oral, Daily    glucose (Dex4) 15 GM/59ML oral liquid 15 g, 15 g, Oral, Q15 Min PRN **OR** glucose (Glutose) 40% oral gel 15 g, 15 g, Oral, Q15 Min PRN **OR** glucose-vitamin C (Dex-4) chewable tab 4 tablet, 4 tablet, Oral, Q15 Min PRN **OR** dextrose 50% injection 50 mL, 50 mL, Intravenous, Q15 Min PRN **OR** glucose (Dex4) 15 GM/59ML oral liquid 30 g, 30 g, Oral, Q15 Min PRN **OR** glucose (Glutose) 40% oral gel 30 g, 30 g, Oral, Q15 Min PRN **OR** glucose-vitamin C (Dex-4) chewable tab 8 tablet, 8 tablet, Oral, Q15 Min PRN    melatonin tab 3 mg, 3 mg, Oral, Nightly PRN    enoxaparin (Lovenox) 40 MG/0.4ML SUBQ injection 40 mg, 40 mg, Subcutaneous, Daily    acetaminophen (Tylenol Extra Strength) tab 500 mg, 500 mg, Oral, Q4H PRN    ondansetron (Zofran) 4 MG/2ML injection 4 mg, 4 mg, Intravenous, Q6H PRN    prochlorperazine (Compazine) 10 MG/2ML injection 5 mg, 5 mg, Intravenous, Q8H PRN    polyethylene glycol (PEG 3350) (Miralax) 17 g oral packet 17 g, 17 g, Oral, Daily PRN    sennosides (Senokot) tab 17.2 mg, 17.2 mg, Oral, Nightly PRN    bisacodyl (Dulcolax) 10 MG rectal suppository 10 mg, 10 mg, Rectal, Daily PRN    fleet enema (Fleet) 7-19 GM/118ML rectal enema 133 mL, 1 enema, Rectal, Once PRN    sodium chloride 0.9% infusion, , Intravenous, Continuous    insulin via Insulin Pump, , Subcutaneous, TID AC and HS    cefTRIAXone (Rocephin) 2 g in D5W 100 mL IVPB-ADD, 2 g,  Intravenous, Q24H    Review of Systems:    A comprehensive 10 point review of systems was completed.  Pertinent positives and negatives noted in the the HPI.    Physical Exam:    General: No acute distress. Alert and oriented x 3.  Vital signs: Blood pressure 105/62, pulse 112, temperature 98.7 °F (37.1 °C), temperature source Oral, resp. rate 18, height 5' 10\" (1.778 m), weight 210 lb 6.4 oz (95.4 kg), SpO2 95%.  HEENT: Moist mucous membranes. Extraocular muscles are intact.  Neck: No lymphadenopathy.  No JVD. No carotid bruits.  Respiratory: Clear to auscultation bilaterally.  No wheezes. No rhonchi.  Cardiovascular: S1, S2.  Regular rate and rhythm.  No murmurs.  Abdomen: Soft, nontender, nondistended.  Positive bowel sounds.  Musculoskeletal: Full range of motion of all extremities.  No swelling noted.  Integument: No lesions. No erythema.  Psychiatric: Appropriate mood and affect.  Neurologic: No focal neurological deficits.    Laboratory Data:  Lab Results   Component Value Date    WBC 13.1 05/01/2024    HGB 14.3 05/01/2024    HCT 41.9 05/01/2024    .0 05/01/2024    CREATSERUM 0.96 05/01/2024    BUN 12 05/01/2024     05/01/2024    K 4.1 05/01/2024     05/01/2024    CO2 24.0 05/01/2024     05/01/2024    CA 9.3 05/01/2024    ALB 3.7 05/01/2024    ALKPHO 136 05/01/2024    BILT 4.3 05/01/2024    TP 8.0 05/01/2024    AST 75 05/01/2024    ALT 48 05/01/2024    PGLU 153 05/02/2024       Microbiologic Data:     Reviewed in EMR    Imaging:  CT Scan    Imaging results reviewed     Impression:  Patient Active Problem List   Diagnosis    Goiter, specified as simple    Screening for iron deficiency anemia    Controlled type 1 diabetes mellitus with hypoglycemia (HCC)    Long-term insulin use (HCC)    Type 1 diabetes mellitus with hyperglycemia (HCC)    Hyperlipidemia, unspecified hyperlipidemia type    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  Probable cystitis rather than pyelonephritis or prostatitis based on acute onset symptomatology.  Fairly rapid improvement.  With high fevers and leukocytosis on presentation.      #Underlying diabetes on insulin pump.  Type I.  Hemoglobin A1c 6.9.  No known history of autonomic neuropathy etc.    PLAN:  Switch to ceftriaxone for daily dosing in the event that he is clinically stable and that we can discharge him tomorrow on oral antibiotic therapy.  If early discharge is warranted based on clinical improvement, we can give him ceftriaxone in the morning and then fill antibiotics either based on susceptibility or, susceptibilities not available, high-dose oral cefadroxil based on his his initial improvement on Ancef.  Patient does have some urgency to get down to [facility] to see his son's graduation on Saturday.  He does understand if he has any fevers overnight or clinical deterioration, he will need to stay in the hospital.      Thank you for allowing me to participate in the care of your patient.    José Doran MD  5/2/2024  3:45 PM

## 2024-05-02 NOTE — PROGRESS NOTES
NURSING ADMISSION NOTE      Patient admitted via Cart  Oriented to room.  Safety precautions initiated.  Bed in low position.  Call light in reach.      Pt A&Ox4 Room Air  Pt Noted to be Flushed (Face)  + UTI IV Abx  Lactic 3.1 down to 1.8  Resting in bed, No Distress Noted  Safety precaution maintained  Plan of Care On-going.

## 2024-05-02 NOTE — PLAN OF CARE
The patient is A/Ox4   On RA, no SOB  Tele - NSR/ST  Vitals Stable  Afebrile  No c/o of pain  On IV Ancef per MAR  Positive blood cultures, Hospitalist notified, new consult to ID, CT ap ordered and is pending  Insulin pump to LLA  Accuchecks  IVF - 0.9 NaCl @ 75ml/hr  Safety precautions in place. Staff will continue to monitor.             Problem: Patient/Family Goals  Goal: Patient/Family Long Term Goal  Description: Patient's Long Term Goal: dc    Interventions:  - medications  - See additional Care Plan goals for specific interventions  5/2/2024 1224 by Omi Guillory RN  Outcome: Progressing  5/2/2024 1222 by Omi Guillory RN  Outcome: Progressing  Goal: Patient/Family Short Term Goal  Description: Patient's Short Term Goal: safety    Interventions:   - frequent rounding   - See additional Care Plan goals for specific interventions  5/2/2024 1224 by Omi Guillory RN  Outcome: Progressing  5/2/2024 1222 by Omi Guillory RN  Outcome: Progressing     Problem: RISK FOR INFECTION - ADULT  Goal: Absence of fever/infection during anticipated neutropenic period  Description: INTERVENTIONS  - Monitor WBC  - Administer growth factors as ordered  - Implement neutropenic guidelines  5/2/2024 1224 by Omi Guillory RN  Outcome: Progressing  5/2/2024 1222 by Omi Guillory RN  Outcome: Progressing     Problem: SAFETY ADULT - FALL  Goal: Free from fall injury  Description: INTERVENTIONS:  - Assess pt frequently for physical needs  - Identify cognitive and physical deficits and behaviors that affect risk of falls.  - [place] fall precautions as indicated by assessment.  - Educate pt/family on patient safety including physical limitations  - Instruct pt to call for assistance with activity based on assessment  - Modify environment to reduce risk of injury  - Provide assistive devices as appropriate  - Consider OT/PT consult to assist with strengthening/mobility  - Encourage toileting  schedule  5/2/2024 1224 by Omi Guillory RN  Outcome: Progressing  5/2/2024 1222 by Omi Guillory RN  Outcome: Progressing     Problem: DISCHARGE PLANNING  Goal: Discharge to home or other facility with appropriate resources  Description: INTERVENTIONS:  - Identify barriers to discharge w/pt and caregiver  - Include patient/family/discharge partner in discharge planning  - Arrange for needed discharge resources and transportation as appropriate  - Identify discharge learning needs (meds, wound care, etc)  - Arrange for interpreters to assist at discharge as needed  - Consider post-discharge preferences of patient/family/discharge partner  - Complete POLST form as appropriate  - Assess patient's ability to be responsible for managing their own health  - Refer to Case Management Department for coordinating discharge planning if the patient needs post-hospital services based on physician/LIP order or complex needs related to functional status, cognitive ability or social support system  5/2/2024 1224 by Omi Guillory RN  Outcome: Progressing  5/2/2024 1222 by Omi Guillory RN  Outcome: Progressing

## 2024-05-02 NOTE — PROGRESS NOTES
[facility]   part of [facility]     Hospitalist Progress Note     Leobardo Fuentes Patient Status:  Inpatient    1970 MRN [id number]   [facility] 3NE-A Attending Dillan Sales, DO   Hosp Day # 1 PCP Matilde Dinh MD     Chief Complaint: Urinary frequency    Subjective:     Patient feels better. No dysuria or urinary frequency today.    Objective:    Review of Systems:   A comprehensive review of systems was completed; pertinent positive and negatives stated in subjective.    Vital signs:  Temp:  [97.9 °F (36.6 °C)-102.5 °F (39.2 °C)] 99.2 °F (37.3 °C)  Pulse:  [] 97  Resp:  [15-20] 18  BP: (107-151)/(66-95) 128/95  SpO2:  [94 %-95 %] 95 %    Physical Exam:    General: No acute distress, awake and alert  Respiratory: No wheezes, no rhonchi  Cardiovascular: S1, S2, regular rate and rhythm  Abdomen: Soft, Non-tender, mild distention (chronic per patient), positive bowel sounds  Neuro: GOMEZ x 4  Extremities: No edema    Diagnostic Data:    Labs:  Recent Labs   Lab 24   WBC 13.1*   HGB 14.3   MCV 96.5   .0*     Recent Labs   Lab 24   *   BUN 12   CREATSERUM 0.96   CA 9.3   ALB 3.7   *   K 4.1      CO2 24.0   ALKPHO 136*   AST 75*   ALT 48   BILT 4.3*   TP 8.0     Estimated Creatinine Clearance: 91.9 mL/min (based on SCr of 0.96 mg/dL).    No results for input(s): \"TROP\", \"TROPHS\", \"CK\" in the last 168 hours.    No results for input(s): \"PTP\", \"INR\" in the last 168 hours.     Microbiology  No results found for this visit on 24.    Imaging: Reviewed in Epic.    Medications:    atorvastatin  10 mg Oral Nightly    lisinopril  10 mg Oral Daily    enoxaparin  40 mg Subcutaneous Daily    ceFAZolin  2 g Intravenous Q8H    insulin   Subcutaneous TID AC and HS       Assessment & Plan:      #Sepsis due to UTI  #Lactic acidosis, resolved  -S/p sepsis bolus, continue maintenance IVF and stop tomorrow  -Follow blood and urine  cultures  -Antibiotics     #Acute UTI  -Ancef  -Urine cultures pending  -Does not want imaging    #Leukocytosis due to above  -Monitor     #DM type I with A1c 6.9  -Continue insulin pump    #Dyslipidemia  -Statin     #Hypertension   -Lisinopril     #Elevated AST  #Thrombocytopenia  #Hyperbilirubinemia  #Coarse liver echotexture concerning for fatty infiltration and hepatocellular disease/cirrhosis   -Tells me he only drinks on the weekends, about 4 cocktails a week. Labs (AST, bilirubin, platelets) and abd US in 2021 concerning for alcoholic liver disease, possible cirrhosis. His PCP ordered US liver with elastography in 2023 but he did not complete it. I offered imaging this admission and he declines saying he will f/u with his PCP  -Monitor CBC and CMP      Dillan Sales,     Supplementary Documentation:     Quality:  DVT Mechanical Prophylaxis:   SCDs,    DVT Pharmacologic Prophylaxis   Medication    enoxaparin (Lovenox) 40 MG/0.4ML SUBQ injection 40 mg   Code Status: Full  King: No urinary catheter in place  MIGUEL: TBD    Discharge is dependent on: Clinical status, cultures  At this point Mr. Fuentes is expected to be discharge to: Home    The 21st Century Cures Act makes medical notes like these available to patients in the interest of transparency. Please be advised this is a medical document. Medical documents are intended to carry relevant information, facts as evident, and the clinical opinion of the practitioner. The medical note is intended as peer to peer communication and may appear blunt or direct. It is written in medical language and may contain abbreviations or verbiage that are unfamiliar.

## 2024-05-03 VITALS
RESPIRATION RATE: 18 BRPM | HEIGHT: 70 IN | WEIGHT: 209 LBS | DIASTOLIC BLOOD PRESSURE: 68 MMHG | HEART RATE: 89 BPM | BODY MASS INDEX: 29.92 KG/M2 | OXYGEN SATURATION: 97 % | TEMPERATURE: 99 F | SYSTOLIC BLOOD PRESSURE: 127 MMHG

## 2024-05-03 LAB
ALBUMIN SERPL-MCNC: 2.5 G/DL (ref 3.4–5)
ALBUMIN/GLOB SERPL: 0.7 {RATIO} (ref 1–2)
ALP LIVER SERPL-CCNC: 109 U/L
ALT SERPL-CCNC: 28 U/L
ANION GAP SERPL CALC-SCNC: 9 MMOL/L (ref 0–18)
AST SERPL-CCNC: 42 U/L (ref 15–37)
BASOPHILS # BLD AUTO: 0.03 X10(3) UL (ref 0–0.2)
BASOPHILS NFR BLD AUTO: 0.2 %
BILIRUB SERPL-MCNC: 3.5 MG/DL (ref 0.1–2)
BUN BLD-MCNC: 14 MG/DL (ref 9–23)
CALCIUM BLD-MCNC: 7.9 MG/DL (ref 8.5–10.1)
CHLORIDE SERPL-SCNC: 103 MMOL/L (ref 98–112)
CO2 SERPL-SCNC: 20 MMOL/L (ref 21–32)
CREAT BLD-MCNC: 0.6 MG/DL
EGFRCR SERPLBLD CKD-EPI 2021: 115 ML/MIN/1.73M2 (ref 60–?)
EOSINOPHIL # BLD AUTO: 0.07 X10(3) UL (ref 0–0.7)
EOSINOPHIL NFR BLD AUTO: 0.5 %
ERYTHROCYTE [DISTWIDTH] IN BLOOD BY AUTOMATED COUNT: 12.6 %
GLOBULIN PLAS-MCNC: 3.4 G/DL (ref 2.8–4.4)
GLUCOSE BLD-MCNC: 171 MG/DL (ref 70–99)
GLUCOSE BLD-MCNC: 172 MG/DL (ref 70–99)
GLUCOSE BLD-MCNC: 183 MG/DL (ref 70–99)
HCT VFR BLD AUTO: 35.2 %
HGB BLD-MCNC: 12.4 G/DL
IMM GRANULOCYTES # BLD AUTO: 0.12 X10(3) UL (ref 0–1)
IMM GRANULOCYTES NFR BLD: 0.9 %
LYMPHOCYTES # BLD AUTO: 1.55 X10(3) UL (ref 1–4)
LYMPHOCYTES NFR BLD AUTO: 11.8 %
MCH RBC QN AUTO: 33.9 PG (ref 26–34)
MCHC RBC AUTO-ENTMCNC: 35.2 G/DL (ref 31–37)
MCV RBC AUTO: 96.2 FL
MONOCYTES # BLD AUTO: 1.49 X10(3) UL (ref 0.1–1)
MONOCYTES NFR BLD AUTO: 11.3 %
NEUTROPHILS # BLD AUTO: 9.9 X10 (3) UL (ref 1.5–7.7)
NEUTROPHILS # BLD AUTO: 9.9 X10(3) UL (ref 1.5–7.7)
NEUTROPHILS NFR BLD AUTO: 75.3 %
OSMOLALITY SERPL CALC.SUM OF ELEC: 279 MOSM/KG (ref 275–295)
PLATELET # BLD AUTO: 90 10(3)UL (ref 150–450)
POTASSIUM SERPL-SCNC: 4.1 MMOL/L (ref 3.5–5.1)
PROT SERPL-MCNC: 5.9 G/DL (ref 6.4–8.2)
RBC # BLD AUTO: 3.66 X10(6)UL
SODIUM SERPL-SCNC: 132 MMOL/L (ref 136–145)
WBC # BLD AUTO: 13.2 X10(3) UL (ref 4–11)

## 2024-05-03 PROCEDURE — 99239 HOSP IP/OBS DSCHRG MGMT >30: CPT | Performed by: INTERNAL MEDICINE

## 2024-05-03 RX ORDER — CIPROFLOXACIN 500 MG/1
500 TABLET, FILM COATED ORAL 2 TIMES DAILY
Qty: 28 TABLET | Refills: 0 | Status: SHIPPED | OUTPATIENT
Start: 2024-05-03 | End: 2024-05-17

## 2024-05-03 NOTE — PLAN OF CARE
Assumed care of patient @ 0730.  No acute distress noted.   Denies pain or discomfort.   A&Ox4.  VSS on RA.   Continent of B&B.  Insulin pump to LLQ.   QID accucheck.   Carb control 00059/60  0.9 NS infusing @ 75cc/hr till 10am.   BC/UC pending  Daily wts. Strict I&Os  Up ad alyssa.   ID following  Problem: Patient/Family Goals  Goal: Patient/Family Long Term Goal  Description: Patient's Long Term Goal: Home  Interventions:  - afebrile  - IV abx  - stable vss  - See additional Care Plan goals for specific interventions  Outcome: Progressing  Goal: Patient/Family Short Term Goal  Description: Patient's Short Term Goal:   Interventions  - See additional Care Plan goals for specific interventions  Outcome: Progressing  Problem: RISK FOR INFECTION - ADULT  Goal: Absence of fever/infection during anticipated neutropenic period  Description: INTERVENTIONS  - Monitor WBC  - Administer growth factors as ordered  - Implement neutropenic guidelines  Outcome: Progressing  Problem: SAFETY ADULT - FALL  Goal: Free from fall injury  Description: INTERVENTIONS:  - Assess pt frequently for physical needs  - Identify cognitive and physical deficits and behaviors that affect risk of falls.  - [place] fall precautions as indicated by assessment.  - Educate pt/family on patient safety including physical limitations  - Instruct pt to call for assistance with activity based on assessment  - Modify environment to reduce risk of injury  - Provide assistive devices as appropriate  - Consider OT/PT consult to assist with strengthening/mobility  - Encourage toileting schedule  Outcome: Progressing  Problem: DISCHARGE PLANNING  Goal: Discharge to home or other facility with appropriate resources  Description: INTERVENTIONS:  - Identify barriers to discharge w/pt and caregiver  - Include patient/family/discharge partner in discharge planning  - Arrange for needed discharge resources and transportation as appropriate  - Identify discharge  learning needs (meds, wound care, etc)  - Arrange for interpreters to assist at discharge as needed  - Consider post-discharge preferences of patient/family/discharge partner  - Complete POLST form as appropriate  - Assess patient's ability to be responsible for managing their own health  - Refer to Case Management Department for coordinating discharge planning if the patient needs post-hospital services based on physician/LIP order or complex needs related to functional status, cognitive ability or social support system  Outcome: Progressing     Problem: Diabetes/Glucose Control  Goal: Glucose maintained within prescribed range  Description: INTERVENTIONS:  - Monitor Blood Glucose as ordered  - Assess for signs and symptoms of hyperglycemia and hypoglycemia  - Administer ordered medications to maintain glucose within target range  - Assess barriers to adequate nutritional intake and initiate nutrition consult as needed  - Instruct patient on self management of diabetes  Outcome: Progressing

## 2024-05-03 NOTE — DISCHARGE INSTRUCTIONS
Do not take the Cefadroxil. Only take the Ciprofloxacin PO x2 weeks upon hospital discharge.  If your symptoms worsen, please go to the nearest hospital. The hospital will call you if antibiotics need to be adjusted for any reason.   May need to return to the hospital if you require IV antibiotics

## 2024-05-03 NOTE — PROGRESS NOTES
[facility], [facility]  TEL: [phone]  FAX: [phone]    Leobardo Fuentes Patient Status:  Inpatient    1970 MRN [id number]   [facility] 3NE-A Attending Dillan Sales, DO   Hosp Day # 2 PCP Matilde Dinh MD     Reason for Consultation:  Bacteremic UTI    History of Present Illness:  Leobardo Fuentes is a a(n) 53 year old male.  With a history of fairly well-controlled diabetes.  Hemoglobin A1c 6.9.  On insulin pump.  Presenting with sudden onset of shaking chills.  Patient was doing some shopping for graduation party for his son from [facility] when he started developing some discomfort with the feeling that he needed to urinate.  He stopped his [employer].  Very small amount of urine.  On the way home, he had developed some chills.  Eventually went to the [facility] ED.  There he was able to urinate but he spiked a fever to 102.  Blood cultures were drawn.  Growing Klebsiella oxytoca.  With pyuria on urinalysis.  He was sent to the ED at [facility].  Started on Ancef.  He feels much better today.  No fevers today so far.  No flank pain.  No dysuria presently.    He has not had any history of UTI in the past.  No repeated antibiotic exposure.    Interval History 5/3/24:  Patient afebrile this morning. Tmax 100.1 last evening. Patient feeling well. Denies fever/chills/sweats, abdominal or flank pain presently, He reports no dysuria, urinary hesitancy, or frequency. He would like to discharge in order to attend his son's college graduation at [employer]. He is tearful about the possibility of not making it there in time.     History:  Past Medical History:    Abdominal hernia    Anxiety    Blurred vision    Decorative tattoo    Diabetes mellitus (HCC)    Essential hypertension    Heartburn    High cholesterol    Stress    Type 1 diabetes mellitus (HCC)    Wears glasses     Past Surgical History:   Procedure Laterality Date    Anesth,surgery of shoulder       Other      Other      shoulder - left    Other surgical history      Knee surgery, ACL 2001    Other surgical history      R Leg, Unspecified treatment 2 rods placed 1988     Family History   Problem Relation Age of Onset    Diabetes Maternal Aunt     Cancer Paternal Grandfather         Colon    Colon Cancer Paternal Grandfather     Dementia Maternal Grandmother     Cancer Paternal Grandmother         Leukemia    Dementia Maternal Grandfather     Diabetes Father     Diabetes Other       reports that he has never smoked. He has never used smokeless tobacco. He reports that he does not currently use alcohol. He reports that he does not use drugs.    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       Medications:    Current Facility-Administered Medications:     atorvastatin (Lipitor) tab 10 mg, 10 mg, Oral, Nightly    lisinopril (Zestril) tab 10 mg, 10 mg, Oral, Daily    glucose (Dex4) 15 GM/59ML oral liquid 15 g, 15 g, Oral, Q15 Min PRN **OR** glucose (Glutose) 40% oral gel 15 g, 15 g, Oral, Q15 Min PRN **OR** glucose-vitamin C (Dex-4) chewable tab 4 tablet, 4 tablet, Oral, Q15 Min PRN **OR** dextrose 50% injection 50 mL, 50 mL, Intravenous, Q15 Min PRN **OR** glucose (Dex4) 15 GM/59ML oral liquid 30 g, 30 g, Oral, Q15 Min PRN **OR** glucose (Glutose) 40% oral gel 30 g, 30 g, Oral, Q15 Min PRN **OR** glucose-vitamin C (Dex-4) chewable tab 8 tablet, 8 tablet, Oral, Q15 Min PRN    melatonin tab 3 mg, 3 mg, Oral, Nightly PRN    enoxaparin (Lovenox) 40 MG/0.4ML SUBQ injection 40 mg, 40 mg, Subcutaneous, Daily    acetaminophen (Tylenol Extra Strength) tab 500 mg, 500 mg, Oral, Q4H PRN    ondansetron (Zofran) 4 MG/2ML injection 4 mg, 4 mg, Intravenous, Q6H PRN    prochlorperazine (Compazine) 10 MG/2ML injection 5 mg, 5 mg, Intravenous, Q8H PRN    polyethylene glycol (PEG 3350) (Miralax) 17 g oral packet 17 g, 17 g, Oral, Daily PRN    sennosides (Senokot) tab  17.2 mg, 17.2 mg, Oral, Nightly PRN    bisacodyl (Dulcolax) 10 MG rectal suppository 10 mg, 10 mg, Rectal, Daily PRN    fleet enema (Fleet) 7-19 GM/118ML rectal enema 133 mL, 1 enema, Rectal, Once PRN    sodium chloride 0.9% infusion, , Intravenous, Continuous    insulin via Insulin Pump, , Subcutaneous, TID AC and HS    cefTRIAXone (Rocephin) 2 g in D5W 100 mL IVPB-ADD, 2 g, Intravenous, Q24H    Review of Systems:    A comprehensive 10 point review of systems was completed.  Pertinent positives and negatives noted in the the HPI.    Physical Exam:    General: No acute distress. Alert and oriented x 3.  Vital signs: Blood pressure 129/69, pulse 89, temperature 98.6 °F (37 °C), temperature source Oral, resp. rate 18, height 5' 10\" (1.778 m), weight 209 lb (94.8 kg), SpO2 95%.  HEENT: Moist mucous membranes. Extraocular muscles are intact.  Neck: No lymphadenopathy.  No JVD. No carotid bruits.  Respiratory: Clear to auscultation bilaterally.  No wheezes. No rhonchi.  Cardiovascular: S1, S2.  Regular rate and rhythm.  No murmurs.  Abdomen: Soft, nontender, nondistended.  Positive bowel sounds.  Musculoskeletal: Full range of motion of all extremities.  No swelling noted.  Integument: No lesions. No erythema.  Psychiatric: Appropriate mood and affect.  Neurologic: No focal neurological deficits.    Laboratory Data:  Lab Results   Component Value Date    WBC 13.2 05/03/2024    HGB 12.4 05/03/2024    HCT 35.2 05/03/2024    PLT 90.0 05/03/2024    CREATSERUM 0.60 05/03/2024    BUN 14 05/03/2024     05/03/2024    K 4.1 05/03/2024     05/03/2024    CO2 20.0 05/03/2024     05/03/2024    CA 7.9 05/03/2024    ALB 2.5 05/03/2024    ALKPHO 109 05/03/2024    BILT 3.5 05/03/2024    TP 5.9 05/03/2024    AST 42 05/03/2024    ALT 28 05/03/2024    PGLU 172 05/03/2024       Microbiologic Data:     Reviewed in EMR    Imaging:  CT Scan    Imaging results reviewed     Impression:  Patient Active Problem List   Diagnosis     Goiter, specified as simple    Screening for iron deficiency anemia    Controlled type 1 diabetes mellitus with hypoglycemia (HCC)    Long-term insulin use (HCC)    Type 1 diabetes mellitus with hyperglycemia (HCC)    Hyperlipidemia, unspecified hyperlipidemia type    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  Probable cystitis rather than pyelonephritis or prostatitis based on acute onset symptomatology.  Fairly rapid improvement.  With high fevers and leukocytosis on presentation.      #Underlying diabetes on insulin pump.  Type I.  Hemoglobin A1c 6.9.  No known history of autonomic neuropathy etc.    PLAN:    -Continue Ceftriaxone IV while in-house  -Called Micro Lab at [facility] and final susceptibilities will not be known until 5/4/24 AM  -If patient is discharged this morning, he should receive today's dose of Ceftriaxone IV just prior to discharge  -After speaking with [facility] director, will opt to empirically switch from high-dose Cefadroxil to Ciprofloxacin PO x 2 weeks based upon most recent hospital antibiogram susceptibilities for K. Oxytoca isolates  -Patient will need to  new script from his designated outpatient pharmacy before going to [place] should he decide to travel.   -Discussed in detail with patient at bedside this morning. All questions answered    Alden Gay MD  [facility], [facility]

## 2024-05-03 NOTE — PROGRESS NOTES
Patient feels well. He is eager for discharge as he has his son's college graduation in [place] tomorrow. However, final susceptibilities will not be known until 5/4/24 AM. He will receive IV ceftriaxone today. Dr. Gay discussed with [facility] director and plan will be to discharge on  Ciprofloxacin PO x 2 weeks based upon most recent hospital antibiogram susceptibilities for K. Oxytoca isolates. Patient understands to seek medical attention if he feels worse. He also understands we would call him if antibiotics need to be adjusted for any reason. Also understands he may need to return to the hospital if IV abx as required. He has scheduled liver US ordered by his PCP next week. He will f/u with PCP in one week. DC summary to follow.    Dillan Sales, DO

## 2024-05-03 NOTE — DISCHARGE SUMMARY
[facility]     Leobardo Fuentes Patient Status:  Inpatient    1970 MRN [id number]   Location [facility] 3NE-A Attending No att. providers found   Hosp Day # 2 PCP Matilde Dinh MD     Date of Admission: 2024  Date of Discharge: 5/3/2024  Discharge Disposition: Home or Self Care    Discharge Diagnosis:   Sepsis due to urinary tract infection  Klebsiella oxytoca bacteremia  Diabetes mellitus type 1  Dyslipidemia  Hypertension  Elevated AST  Thrombocytopenia  Hyperbilirubinemia  Hepatic steatosis    History of Present Illness: Leobardo Fuentes is a 53 year old male with history of type 1 diabetes on insulin pump, hypertension, hyperlipidemia presents to the emergency room with dysuria and polyuria that started today patient only urinating small amounts of urine at a time and has had increased urge to urinate.  Patient then later developed fever in the emergency room.  No chills, nausea, vomiting.  No chest pain, shortness of breath.  No gross hematuria, hematochezia, melena.     Brief Synopsis: Patient was started on IV antibiotics and felt significantly better in the hospital.  His dysuria and urinary frequency resolved.  Blood cultures grew Klebsiella oxytoca.  Urine cultures grew gram-negative inocente, identification pending. ID consulted. He is eager for discharge as he has his son's college graduation in [place] tomorrow. However, final susceptibilities will not be known until tomorrow morning. He received IV ceftriaxone prior to discharge. Dr. Gay discussed with [facility] director and plan will be to discharge on  Ciprofloxacin PO x 2 weeks based upon most recent hospital antibiogram susceptibilities for K. Oxytoca isolates. Patient understands to seek medical attention if he feels worse. He also understands we would call him if antibiotics need to be adjusted for any reason. Also understands he may need to return to the hospital if IV abx as  required. He has scheduled liver US ordered by his PCP next week as he may have underlying liver disease given elevated AST, thousand anemia and hyperbilirubinemia. He will f/u with PCP in one week.    Lace+ Score: 39  59-90 High Risk  29-58 Medium Risk  0-28   Low Risk       TCM Follow-Up Recommendation:  LACE 29-58: Moderate Risk of readmission after discharge from the hospital.    Procedures during hospitalization:   None    Incidental or significant findings and recommendations (brief descriptions):  Please see brief synopsis above    Lab/Test results pending at Discharge:   Final blood and urine cultures    Consultants:  ID    Discharge Medication List:     Discharge Medications        START taking these medications        Instructions Prescription details   ciprofloxacin 500 MG Tabs  Commonly known as: Cipro      Take 1 tablet (500 mg total) by mouth 2 (two) times daily for 14 days.   Stop taking on: May 17, 2024  Quantity: 28 tablet  Refills: 0            CHANGE how you take these medications        Instructions Prescription details   atorvastatin 10 MG Tabs  Commonly known as: Lipitor  What changed: when to take this      Take 1 tablet (10 mg total) by mouth daily.   Quantity: 90 tablet  Refills: 3            CONTINUE taking these medications        Instructions Prescription details   Acetone (Urine) Test Strp  Commonly known as: Ketostix      Test urine for ketones for BG >250 mg/dL   Quantity: 25 strip  Refills: prn     Kalen Microlet Lancets Misc  Generic drug: Microlet Lancets      Test 4 times daily   Quantity: 400 each  Refills: 3     Dexcom G6  Shanelle      1 each by Does not apply route daily.   Quantity: 1 Device  Refills: 3     Dexcom G6 Sensor Misc      1 each by Does not apply route See Admin Instructions. Every 10 days   Quantity: 9 each  Refills: 3     Dexcom G6 Transmitter Misc      1 each by Does not apply route See Admin Instructions. Every 90 days   Quantity: 3 each  Refills: 3      Glucose Blood Strp      Test 4 times daily   Quantity: 400 strip  Refills: 1     insulin aspart 100 Units/mL Soln  Commonly known as: NovoLOG      Up to 60 units daily via insulin pump   Refills: 0     lisinopril 10 MG Tabs  Commonly known as: Zestril      Take 1 tablet (10 mg total) by mouth daily.   Refills: 0     Omnipod 5 G6 Pods (Gen 5) Misc      USE 1 every 3 (three) days.   Refills: 0            STOP taking these medications      PEG 3350-KCl-Na Bicarb-NaCl 420 g Solr  Commonly known as: NULYTELY                  Where to Get Your Medications        These medications were sent to [id number] - [place], [place] - [address] [facility] [address], [phone], [phone]  [address] [facility] [address]      Hours: 24-hours Phone: [phone]   ciprofloxacin 500 MG Tabs         [place] No [place] substances prescribed at discharge.    Follow-up appointment:   Matilde Dinh MD  [address] [place] [id number] [phone]    Follow up in 1 week(s)      Appointments for Next 30 Days 5/3/2024 - 6/2/2024      None            Vital signs:  Temp:  [98.3 °F (36.8 °C)-100.4 °F (38 °C)] 99 °F (37.2 °C)  Pulse:  [78-96] 89  Resp:  [18] 18  BP: (100-129)/(50-86) 127/68  SpO2:  [92 %-97 %] 97 %    Physical Exam:    General: No acute distress, awake and alert  Respiratory: No wheezes, no rhonchi  Cardiovascular: S1, S2, regular rate and rhythm  Abdomen: Soft, Non-tender, non-distended, positive bowel sounds  Neuro: GOMEZ x 4  Extremities: No edema  -----------------------------------------------------------------------------------------------  PATIENT DISCHARGE INSTRUCTIONS: See electronic chart    Dillan Sales DO    Time spent:  32 minutes

## 2024-05-03 NOTE — PLAN OF CARE
Assumed care at 1930. Pt. A&O x4, slightly withdrawn, on RA, NSR on tele. VSS. Up ad alyssa. No c/o pain. 0.9 infusing at 75/hr. QID accuchecks, insulin via pump. Daily weight. Fall and safety precautions in place. Plan of care ongoing.     0000- Febrile x1, Tylenol given per MAR with good affect.    Problem: RISK FOR INFECTION - ADULT  Goal: Absence of fever/infection during anticipated neutropenic period  Description: INTERVENTIONS  - Monitor WBC  - Administer growth factors as ordered  - Implement neutropenic guidelines  Outcome: Progressing     Problem: SAFETY ADULT - FALL  Goal: Free from fall injury  Description: INTERVENTIONS:  - Assess pt frequently for physical needs  - Identify cognitive and physical deficits and behaviors that affect risk of falls.  - [place] fall precautions as indicated by assessment.  - Educate pt/family on patient safety including physical limitations  - Instruct pt to call for assistance with activity based on assessment  - Modify environment to reduce risk of injury  - Provide assistive devices as appropriate  - Consider OT/PT consult to assist with strengthening/mobility  - Encourage toileting schedule  Outcome: Progressing     Problem: DISCHARGE PLANNING  Goal: Discharge to home or other facility with appropriate resources  Description: INTERVENTIONS:  - Identify barriers to discharge w/pt and caregiver  - Include patient/family/discharge partner in discharge planning  - Arrange for needed discharge resources and transportation as appropriate  - Identify discharge learning needs (meds, wound care, etc)  - Arrange for interpreters to assist at discharge as needed  - Consider post-discharge preferences of patient/family/discharge partner  - Complete POLST form as appropriate  - Assess patient's ability to be responsible for managing their own health  - Refer to Case Management Department for coordinating discharge planning if the patient needs post-hospital services based on  physician/LIP order or complex needs related to functional status, cognitive ability or social support system  Outcome: Progressing     Problem: Diabetes/Glucose Control  Goal: Glucose maintained within prescribed range  Description: INTERVENTIONS:  - Monitor Blood Glucose as ordered  - Assess for signs and symptoms of hyperglycemia and hypoglycemia  - Administer ordered medications to maintain glucose within target range  - Assess barriers to adequate nutritional intake and initiate nutrition consult as needed  - Instruct patient on self management of diabetes  Outcome: Progressing

## 2024-05-03 NOTE — PROGRESS NOTES
Pt discharged in stable condition. IV and tele removed.  Received IV rocephin before leaving. Reviewed discharge paperwork in detail. Reiterated that patient should seek medical attention if does not feel well. Provided education on ciprofloxacin. Patient verbalized understanding.

## 2024-05-06 ENCOUNTER — TELEPHONE (OUTPATIENT)
Dept: INTERNAL MEDICINE CLINIC | Facility: CLINIC | Age: 54
End: 2024-05-06

## 2024-05-06 ENCOUNTER — PATIENT OUTREACH (OUTPATIENT)
Dept: CASE MANAGEMENT | Age: 54
End: 2024-05-06

## 2024-05-06 DIAGNOSIS — Z02.9 ENCOUNTERS FOR UNSPECIFIED ADMINISTRATIVE PURPOSE: Primary | ICD-10-CM

## 2024-05-06 DIAGNOSIS — N39.0 SEPSIS DUE TO URINARY TRACT INFECTION (HCC): ICD-10-CM

## 2024-05-06 DIAGNOSIS — A41.9 SEPSIS DUE TO URINARY TRACT INFECTION (HCC): ICD-10-CM

## 2024-05-06 NOTE — PROGRESS NOTES
Initial Post Discharge Follow Up   Discharge Date: 5/3/24  Contact Date: 5/6/2024    Consent Verification:  Assessment Completed With: Patient  HIPAA Verified?  Yes    Discharge Dx:   Sepsis due to urinary tract infection  Klebsiella oxytoca bacteremia  Diabetes mellitus type 1  Dyslipidemia  Hypertension  Elevated AST  Thrombocytopenia  Hyperbilirubinemia  Hepatic steatosis    General:   How have you been since your discharge from the hospital? NCM spoke with pt states he is feeling well. Pt denies any fevers, chills, nausea, vomiting, shortness of breath, chest pain or any others. Pt reports BS at time of call at 116. He is taking antibiotics as prescribed, no issues. Pt denies any problems with urination.    Do you have any pain since discharge?  No    How well was your pain managed while in the hospital?   On a scale of 1-5   1- Very Poor and 5- Very well   5  When you were leaving the hospital were your discharge instructions reviewed with you? Yes  How well were your discharge instructions explained to you?   On a scale of 1-5   1- Very Poor and 5- Very well   5  Do you have any questions about your discharge instructions?  No  Before leaving the hospital was your diagnoses explained to you? Yes  Do you have any questions about your diagnoses? No  Are you able to perform normal daily activities of living as you have prior to your hospital stay (dressing, bathing, ambulating to the bathroom, etc)? yes  (NCM) Was patient given a different diet per AVS? no      Medications: Reviewed medication list.  Medications are up to date.  Current Outpatient Medications   Medication Sig Dispense Refill    ciprofloxacin 500 MG Oral Tab Take 1 tablet (500 mg total) by mouth 2 (two) times daily for 14 days. 28 tablet 0    Insulin Disposable Pump (OMNIPOD 5 G6 POD, GEN 5,) Does not apply Misc USE 1 every 3 (three) days.      insulin aspart 100 Units/mL Injection Solution Up to 60 units daily via insulin pump      lisinopril 10  MG Oral Tab Take 1 tablet (10 mg total) by mouth daily.      Glucose Blood In Vitro Strip Test 4 times daily 400 strip 1    atorvastatin 10 MG Oral Tab Take 1 tablet (10 mg total) by mouth daily. (Patient taking differently: Take 1 tablet (10 mg total) by mouth nightly.) 90 tablet 3    Kalen Microlet Lancets Does not apply Misc Test 4 times daily 400 each 3    Continuous Blood Gluc  (DEXCOM G6 ) Does not apply Device 1 each by Does not apply route daily. 1 Device 3    Continuous Blood Gluc Sensor (DEXCOM G6 SENSOR) Does not apply Misc 1 each by Does not apply route See Admin Instructions. Every 10 days 9 each 3    Continuous Blood Gluc Transmit (DEXCOM G6 TRANSMITTER) Does not apply Misc 1 each by Does not apply route See Admin Instructions. Every 90 days 3 each 3    Acetone, Urine, Test (KETOSTIX) In Vitro Strip Test urine for ketones for BG >250 mg/dL 25 strip prn     Were there any changes to your current medication(s) noted on the AVS? Yes  If so, were these medication changes discussed with you prior to leaving the hospital? Yes  If a new medication was prescribed:    Was the new medication's purpose & side effects reviewed? Yes  Do you have any questions about your new medication? No  Did you  your discharge medications when you left the hospital? Yes  Let's go over your medications together to make sure we are not missing anything. Medications Reviewed  Are there any reasons that keep you from taking your medication as prescribed? No  Are you having any concerns with constipation? No      Discharge medications reviewed/discussed/and reconciled against outpatient medications with patient.  Any changes or updates to medications sent to PCP.  Patient Acknowledged     Referrals/orders at D/C:  Referrals/orders placed at D/C? no    DME ordered at D/C? No      Discharge orders, AVS reviewed and discussed with patient. Any changes or updates to orders sent to PCP.  Patient  Acknowledged      SDOH:   Transportation Needs: No Transportation Needs (5/1/2024)    Transportation Needs     Lack of Transportation: No     Financial Resource Strain: Low Risk  (5/6/2024)    Financial Resource Strain     Difficulty of Paying Living Expenses: Not hard at all     Med Affordability: Not on file           Follow up appointments:          TCC  Was TCC ordered: No      PCP (If no TCC appointment)  Does patient already have a PCP appointment scheduled? No  NCM Attempted to schedule PCP office TCM appointment with patient   If no appointment scheduled: Explain: pt declined will schedule on his own.     Specialist    Does the patient have any other follow up appointment(s) needing to be scheduled? No      Is there any reason as to why you cannot make your appointment(s)?  No     Needs post D/C:   Now that you are home, are there any needs or concerns you need addressed before your next visit with your PCP?  (DME, meds, questions, etc.): No    Interventions by NCM:   All discharge instructions reviewed with the patient. Reviewed when to call MD vs when to call 911 or go the ED. Educated patient on the importance of taking all meds as prescribed as well as close f/u with PCP/specialists. Pt verbalized understanding and will contact the office with any further questions or concerns. Patient denies fevers, chills, nausea, vomiting, shortness of breath, chest pain, or any other symptoms at this time.  NCM attempted to schedule HFU, patient declined will call PCP/TCC office directly. NCM sent TE to PCP office re: assistance in scheduling HFU appt. NCM provided contact information for any further questions/concerns. Patient verbalized understanding and agreeable.       Overall Rating:   How would you rate the care you received while in the hospital? good    CCM referral placed:    No    BOOK BY DATE: 05/17/24

## 2024-05-06 NOTE — TELEPHONE ENCOUNTER
Spoke to pt for TCM today.  Pt does not have an appointment scheduled at this time.  TCM appt recommended by 5/17/24 as pt is a moderate risk for readmission.  Please advise.    BOOK BY DATE (last date for TCM): 05/17/24    Clinical staff:  Please f/u with pt and try to get them to schedule as pt would greatly benefit from TCM appt.  Thank you!

## 2024-05-06 NOTE — PAYOR COMM NOTE
--------------  DISCHARGE REVIEW    Payor: [facility] OUT OF STATE PPO  Subscriber #:  [id number]  Authorization Number: [id number]    Admit date: 24  Admit time:  11:28 PM  Discharge Date: 5/3/2024  1:10 PM     Admitting Physician: Pepe Garcia DO  Attending Physician:  No att. providers found  Primary Care Physician: Matilde Dinh MD          Discharge Summary Notes        Discharge Summary signed by Dillan Sales DO at 5/3/2024  4:03 PM       Author: Dillan Sales DO Specialty: HOSPITALIST, Internal Medicine Author Type: Physician    Filed: 5/3/2024  4:03 PM Date of Service: 5/3/2024  3:59 PM Status: Signed    : Dillan Sales DO (Physician)           [facility]     Leobardo Fuentes Patient Status:  Inpatient    1970 MRN [id number]   Location [facility] 3NE-A Attending No att. providers found   Hosp Day # 2 PCP Matilde Dinh MD     Date of Admission: 2024  Date of Discharge: 5/3/2024  Discharge Disposition: Home or Self Care    Discharge Diagnosis:   Sepsis due to urinary tract infection  Klebsiella oxytoca bacteremia  Diabetes mellitus type 1  Dyslipidemia  Hypertension  Elevated AST  Thrombocytopenia  Hyperbilirubinemia  Hepatic steatosis    History of Present Illness: Leobardo Fuentes is a 53 year old male with history of type 1 diabetes on insulin pump, hypertension, hyperlipidemia presents to the emergency room with dysuria and polyuria that started today patient only urinating small amounts of urine at a time and has had increased urge to urinate.  Patient then later developed fever in the emergency room.  No chills, nausea, vomiting.  No chest pain, shortness of breath.  No gross hematuria, hematochezia, melena.     Brief Synopsis: Patient was started on IV antibiotics and felt significantly better in the hospital.  His dysuria and urinary frequency resolved.  Blood cultures grew Klebsiella oxytoca.  Urine cultures grew gram-negative inocente,  identification pending. ID consulted. He is eager for discharge as he has his son's college graduation in [place] tomorrow. However, final susceptibilities will not be known until tomorrow morning. He received IV ceftriaxone prior to discharge. Dr. Gay discussed with [facility] director and plan will be to discharge on  Ciprofloxacin PO x 2 weeks based upon most recent hospital antibiogram susceptibilities for K. Oxytoca isolates. Patient understands to seek medical attention if he feels worse. He also understands we would call him if antibiotics need to be adjusted for any reason. Also understands he may need to return to the hospital if IV abx as required. He has scheduled liver US ordered by his PCP next week as he may have underlying liver disease given elevated AST, thousand anemia and hyperbilirubinemia. He will f/u with PCP in one week.    Lace+ Score: 39  59-90 High Risk  29-58 Medium Risk  0-28   Low Risk       TCM Follow-Up Recommendation:  LACE 29-58: Moderate Risk of readmission after discharge from the hospital.    Procedures during hospitalization:   None    Incidental or significant findings and recommendations (brief descriptions):  Please see brief synopsis above    Lab/Test results pending at Discharge:   Final blood and urine cultures    Consultants:  ID    Discharge Medication List:     Discharge Medications        START taking these medications        Instructions Prescription details   ciprofloxacin 500 MG Tabs  Commonly known as: Cipro      Take 1 tablet (500 mg total) by mouth 2 (two) times daily for 14 days.   Stop taking on: May 17, 2024  Quantity: 28 tablet  Refills: 0            CHANGE how you take these medications        Instructions Prescription details   atorvastatin 10 MG Tabs  Commonly known as: Lipitor  What changed: when to take this      Take 1 tablet (10 mg total) by mouth daily.   Quantity: 90 tablet  Refills: 3            CONTINUE taking these  medications        Instructions Prescription details   Acetone (Urine) Test Strp  Commonly known as: Ketostix      Test urine for ketones for BG >250 mg/dL   Quantity: 25 strip  Refills: prn     Kalen Microlet Lancets Misc  Generic drug: Microlet Lancets      Test 4 times daily   Quantity: 400 each  Refills: 3     Dexcom G6  Shanelle      1 each by Does not apply route daily.   Quantity: 1 Device  Refills: 3     Dexcom G6 Sensor Misc      1 each by Does not apply route See Admin Instructions. Every 10 days   Quantity: 9 each  Refills: 3     Dexcom G6 Transmitter Misc      1 each by Does not apply route See Admin Instructions. Every 90 days   Quantity: 3 each  Refills: 3     Glucose Blood Strp      Test 4 times daily   Quantity: 400 strip  Refills: 1     insulin aspart 100 Units/mL Soln  Commonly known as: NovoLOG      Up to 60 units daily via insulin pump   Refills: 0     lisinopril 10 MG Tabs  Commonly known as: Zestril      Take 1 tablet (10 mg total) by mouth daily.   Refills: 0     Omnipod 5 G6 Pods (Gen 5) Misc      USE 1 every 3 (three) days.   Refills: 0            STOP taking these medications      PEG 3350-KCl-Na Bicarb-NaCl 420 g Solr  Commonly known as: NULYTELY                  Where to Get Your Medications        These medications were sent to [facility] (acquired by [id number] - [place], [place] - [address] AT [facility] [address], [phone], [phone]  [address] [place] [address], [facility] [address]      Hours: 24-hours Phone: [phone]   ciprofloxacin 500 MG Tabs         [place] No controlled substances prescribed at discharge.    Follow-up appointment:   Matilde Dinh MD  [address] [address] [phone]    Follow up in 1 week(s)      Appointments for Next 30 Days 5/3/2024 - 6/2/2024      None            Vital signs:  Temp:  [98.3 °F (36.8 °C)-100.4 °F (38 °C)] 99 °F (37.2 °C)  Pulse:  [78-96] 89  Resp:  [18] 18  BP: (100-129)/(50-86)  127/68  SpO2:  [92 %-97 %] 97 %    Physical Exam:    General: No acute distress, awake and alert  Respiratory: No wheezes, no rhonchi  Cardiovascular: S1, S2, regular rate and rhythm  Abdomen: Soft, Non-tender, non-distended, positive bowel sounds  Neuro: GOMEZ x 4  Extremities: No edema  -----------------------------------------------------------------------------------------------  PATIENT DISCHARGE INSTRUCTIONS: See electronic chart    Dillan Sales DO    Time spent:  32 minutes    Electronically signed by Dillan Sales DO on 5/3/2024  4:03 PM         REVIEWER COMMENTS

## 2024-05-07 LAB
BLACTX-M ISLT/SPM QL: NOT DETECTED
K OXYTOCA DNA BLD POS QL NAA+NON-PROBE: DETECTED

## 2024-05-14 ENCOUNTER — LAB ENCOUNTER (OUTPATIENT)
Dept: LAB | Age: 54
End: 2024-05-14
Attending: INTERNAL MEDICINE

## 2024-05-14 DIAGNOSIS — R74.8 ELEVATED LIVER ENZYMES: ICD-10-CM

## 2024-05-14 LAB
ALBUMIN SERPL-MCNC: 3.5 G/DL (ref 3.4–5)
ALBUMIN/GLOB SERPL: 0.9 {RATIO} (ref 1–2)
ALP LIVER SERPL-CCNC: 118 U/L
ALT SERPL-CCNC: 64 U/L
ANION GAP SERPL CALC-SCNC: 6 MMOL/L (ref 0–18)
AST SERPL-CCNC: 73 U/L (ref 15–37)
BILIRUB SERPL-MCNC: 1.5 MG/DL (ref 0.1–2)
BUN BLD-MCNC: 11 MG/DL (ref 9–23)
CALCIUM BLD-MCNC: 9.5 MG/DL (ref 8.5–10.1)
CHLORIDE SERPL-SCNC: 103 MMOL/L (ref 98–112)
CO2 SERPL-SCNC: 27 MMOL/L (ref 21–32)
CREAT BLD-MCNC: 0.77 MG/DL
EGFRCR SERPLBLD CKD-EPI 2021: 107 ML/MIN/1.73M2 (ref 60–?)
FASTING STATUS PATIENT QL REPORTED: YES
GLOBULIN PLAS-MCNC: 4 G/DL (ref 2.8–4.4)
GLUCOSE BLD-MCNC: 154 MG/DL (ref 70–99)
OSMOLALITY SERPL CALC.SUM OF ELEC: 284 MOSM/KG (ref 275–295)
POTASSIUM SERPL-SCNC: 4.3 MMOL/L (ref 3.5–5.1)
PROT SERPL-MCNC: 7.5 G/DL (ref 6.4–8.2)
SODIUM SERPL-SCNC: 136 MMOL/L (ref 136–145)

## 2024-05-14 PROCEDURE — 80053 COMPREHEN METABOLIC PANEL: CPT | Performed by: INTERNAL MEDICINE

## 2024-05-15 ENCOUNTER — OFFICE VISIT (OUTPATIENT)
Dept: INTERNAL MEDICINE CLINIC | Facility: CLINIC | Age: 54
End: 2024-05-15

## 2024-05-15 VITALS
WEIGHT: 200.81 LBS | SYSTOLIC BLOOD PRESSURE: 132 MMHG | TEMPERATURE: 99 F | BODY MASS INDEX: 29 KG/M2 | DIASTOLIC BLOOD PRESSURE: 70 MMHG | HEART RATE: 74 BPM | OXYGEN SATURATION: 98 % | RESPIRATION RATE: 15 BRPM

## 2024-05-15 DIAGNOSIS — K76.0 FATTY INFILTRATION OF LIVER: ICD-10-CM

## 2024-05-15 DIAGNOSIS — E10.319 CONTROLLED TYPE 1 DIABETES MELLITUS WITH RETINOPATHY, MACULAR EDEMA PRESENCE UNSPECIFIED, UNSPECIFIED LATERALITY, UNSPECIFIED RETINOPATHY SEVERITY (HCC): ICD-10-CM

## 2024-05-15 DIAGNOSIS — Z09 HOSPITAL DISCHARGE FOLLOW-UP: Primary | ICD-10-CM

## 2024-05-15 DIAGNOSIS — I10 ESSENTIAL HYPERTENSION: ICD-10-CM

## 2024-05-15 DIAGNOSIS — R74.8 ELEVATED LIVER ENZYMES: ICD-10-CM

## 2024-05-15 LAB
CREAT UR-SCNC: 34.4 MG/DL
MICROALBUMIN UR-MCNC: <0.3 MG/DL

## 2024-05-15 PROCEDURE — 3078F DIAST BP <80 MM HG: CPT | Performed by: INTERNAL MEDICINE

## 2024-05-15 PROCEDURE — 3075F SYST BP GE 130 - 139MM HG: CPT | Performed by: INTERNAL MEDICINE

## 2024-05-15 PROCEDURE — 82043 UR ALBUMIN QUANTITATIVE: CPT | Performed by: INTERNAL MEDICINE

## 2024-05-15 PROCEDURE — 3044F HG A1C LEVEL LT 7.0%: CPT | Performed by: INTERNAL MEDICINE

## 2024-05-15 PROCEDURE — 99495 TRANSJ CARE MGMT MOD F2F 14D: CPT | Performed by: INTERNAL MEDICINE

## 2024-05-15 PROCEDURE — 82570 ASSAY OF URINE CREATININE: CPT | Performed by: INTERNAL MEDICINE

## 2024-05-15 RX ORDER — LISINOPRIL 10 MG/1
10 TABLET ORAL DAILY
Qty: 90 TABLET | Refills: 3 | Status: SHIPPED | OUTPATIENT
Start: 2024-05-15

## 2024-05-15 NOTE — PROGRESS NOTES
Subjective:   Leobardo Fuentes is a 53 year old male who presents for hospital follow up.   He was discharged from [facility] to Home or Self Care  Admission Date: 5/1/24   Discharge Date: 5/3/24  Hospital Discharge Diagnosis:   Sepsis due to urinary tract infection  Klebsiella oxytoca bacteremia  Diabetes mellitus type 1  Dyslipidemia  Hypertension  Elevated AST  Thrombocytopenia  Hyperbilirubinemia  Hepatic steatosis    Interactive contact within 2 business days post discharge first initiated on Date: 5/6/2024    During the visit, the following was completed:  Obtained and reviewed discharge summary, continuity of care documents, and Hospitalist notes  Reviewed Labs (CBC, CMP), Labs (Microbiology), and CT radiology results    HPI: he was hospitalized with UTI/urosepsis and now feels well with symptoms resolved. He is on extended course of oral ciprofloxacin. Found to have positive blood cultures. Seen by ID.     History/Other:   Current Medications:  Medication Reconciliation:  I am aware of an inpatient discharge within the last 30 days.  The discharge medication list has been reconciled with the patient's current medication list and reviewed by me.  See medication list for additions of new medication, and changes to current doses of medications and discontinued medications.  Outpatient Medications Marked as Taking for the 5/15/24 encounter (Office Visit) with Matilde Dinh MD   Medication Sig    lisinopril 10 MG Oral Tab Take 1 tablet (10 mg total) by mouth daily.    ciprofloxacin 500 MG Oral Tab Take 1 tablet (500 mg total) by mouth 2 (two) times daily for 14 days.    Insulin Disposable Pump (OMNIPOD 5 G6 POD, GEN 5,) Does not apply Misc USE 1 every 3 (three) days.    insulin aspart 100 Units/mL Injection Solution Up to 60 units daily via insulin pump    Glucose Blood In Vitro Strip Test 4 times daily    atorvastatin 10 MG Oral Tab Take 1 tablet (10 mg total) by mouth daily. (Patient taking differently: Take  1 tablet (10 mg total) by mouth nightly.)    [employer] Microlet Lancets Does not apply Misc Test 4 times daily    Continuous Blood Gluc  (DEXCOM G6 ) Does not apply Device 1 each by Does not apply route daily.    Continuous Blood Gluc Sensor (DEXCOM G6 SENSOR) Does not apply Misc 1 each by Does not apply route See Admin Instructions. Every 10 days    Continuous Blood Gluc Transmit (DEXCOM G6 TRANSMITTER) Does not apply Misc 1 each by Does not apply route See Admin Instructions. Every 90 days       Review of Systems   Constitutional:  Negative for fever.   Eyes:  Negative for visual disturbance.   Respiratory:  Negative for shortness of breath.    Cardiovascular:  Negative for chest pain.   Gastrointestinal:  Negative for constipation.   Neurological: Negative.    Hematological: Negative.    Psychiatric/Behavioral: Negative.           Objective:   Physical Exam  Vitals reviewed.   Constitutional:       General: He is not in acute distress.     Appearance: He is well-developed.   HENT:      Head: Normocephalic and atraumatic.   Eyes:      Conjunctiva/sclera: Conjunctivae normal.   Cardiovascular:      Rate and Rhythm: Normal rate and regular rhythm.      Heart sounds: Normal heart sounds.   Pulmonary:      Effort: Pulmonary effort is normal.      Breath sounds: Normal breath sounds.   Musculoskeletal:      Cervical back: Neck supple.   Skin:     General: Skin is warm and dry.   Neurological:      General: No focal deficit present.      Mental Status: He is alert.   Psychiatric:         Mood and Affect: Mood normal.         /70 (BP Location: Left arm, Patient Position: Sitting, Cuff Size: adult)   Pulse 74   Temp 98.6 °F (37 °C) (Temporal)   Resp 15   Wt 200 lb 12.8 oz (91.1 kg)   SpO2 98%   BMI 28.81 kg/m²  Estimated body mass index is 28.81 kg/m² as calculated from the following:    Height as of 5/1/24: 5' 10\" (1.778 m).    Weight as of this encounter: 200 lb 12.8 oz (91.1 kg).    Assessment &  Plan:   1. Hospital discharge follow-up (Primary)  2. Elevated liver enzymes - plan for repeat liver enzymes in 2 months and liver US with elastrography. CT shows evidence of fatty liver.   -     US LIVER WITH ELASTOGRAPHY(CPT=76705,65654); Future; Expected date: 05/15/2024  -     Hepatic Function Panel (7); Future; Expected date: 07/15/2024  3. 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 Fatty infiltration of liver   -     US LIVER WITH ELASTOGRAPHY(CPT=76705,01465); Future; Expected date: 05/15/2024  5. Essential hypertension - continue lisinopril 10mg daily   Other orders  -     Lisinopril; Take 1 tablet (10 mg total) by mouth daily.  Dispense: 90 tablet; Refill: 3        Return in about 1 year (around 5/15/2025) for physical.

## 2024-05-15 NOTE — PATIENT INSTRUCTIONS
Blood work for liver enzyme follow up in 2 months    Plan for liver ultrasound with elastography later this summer - call to schedule at [phone] central scheduling

## 2024-12-02 ENCOUNTER — APPOINTMENT (OUTPATIENT)
Dept: CT IMAGING | Age: 54
End: 2024-12-02
Attending: EMERGENCY MEDICINE
Payer: COMMERCIAL

## 2024-12-02 ENCOUNTER — APPOINTMENT (OUTPATIENT)
Dept: CV DIAGNOSTICS | Age: 54
End: 2024-12-02
Attending: EMERGENCY MEDICINE
Payer: COMMERCIAL

## 2024-12-02 ENCOUNTER — HOSPITAL ENCOUNTER (EMERGENCY)
Age: 54
Discharge: HOME | End: 2024-12-02
Attending: EMERGENCY MEDICINE
Payer: COMMERCIAL

## 2024-12-02 ENCOUNTER — APPOINTMENT (OUTPATIENT)
Dept: GENERAL RADIOLOGY | Age: 54
End: 2024-12-02
Attending: EMERGENCY MEDICINE
Payer: COMMERCIAL

## 2024-12-02 VITALS
DIASTOLIC BLOOD PRESSURE: 80 MMHG | WEIGHT: 200 LBS | BODY MASS INDEX: 28.63 KG/M2 | SYSTOLIC BLOOD PRESSURE: 140 MMHG | RESPIRATION RATE: 18 BRPM | TEMPERATURE: 98 F | HEART RATE: 88 BPM | HEIGHT: 70 IN | OXYGEN SATURATION: 99 %

## 2024-12-02 DIAGNOSIS — R07.9 CHEST PAIN OF UNCERTAIN ETIOLOGY: Primary | ICD-10-CM

## 2024-12-02 DIAGNOSIS — K74.60 CIRRHOSIS OF LIVER WITHOUT ASCITES, UNSPECIFIED HEPATIC CIRRHOSIS TYPE (HCC): ICD-10-CM

## 2024-12-02 LAB
ALBUMIN SERPL-MCNC: 4.3 G/DL (ref 3.2–4.8)
ALBUMIN/GLOB SERPL: 1.3 {RATIO} (ref 1–2)
ALP LIVER SERPL-CCNC: 117 U/L
ALT SERPL-CCNC: 33 U/L
ANION GAP SERPL CALC-SCNC: 5 MMOL/L (ref 0–18)
AST SERPL-CCNC: 53 U/L (ref ?–34)
ATRIAL RATE: 93 BPM
BASOPHILS # BLD AUTO: 0.05 X10(3) UL (ref 0–0.2)
BASOPHILS NFR BLD AUTO: 0.8 %
BILIRUB SERPL-MCNC: 2.2 MG/DL (ref 0.3–1.2)
BUN BLD-MCNC: 8 MG/DL (ref 9–23)
CALCIUM BLD-MCNC: 9.7 MG/DL (ref 8.7–10.4)
CHLORIDE SERPL-SCNC: 104 MMOL/L (ref 98–112)
CO2 SERPL-SCNC: 27 MMOL/L (ref 21–32)
CREAT BLD-MCNC: 0.68 MG/DL
D DIMER PPP FEU-MCNC: 0.68 UG/ML FEU (ref ?–0.53)
EGFRCR SERPLBLD CKD-EPI 2021: 111 ML/MIN/1.73M2 (ref 60–?)
EOSINOPHIL # BLD AUTO: 0.23 X10(3) UL (ref 0–0.7)
EOSINOPHIL NFR BLD AUTO: 3.9 %
ERYTHROCYTE [DISTWIDTH] IN BLOOD BY AUTOMATED COUNT: 12.6 %
GLOBULIN PLAS-MCNC: 3.2 G/DL (ref 2–3.5)
GLUCOSE BLD-MCNC: 151 MG/DL (ref 70–99)
HCT VFR BLD AUTO: 40.4 %
HGB BLD-MCNC: 14.2 G/DL
IMM GRANULOCYTES # BLD AUTO: 0.01 X10(3) UL (ref 0–1)
IMM GRANULOCYTES NFR BLD: 0.2 %
LYMPHOCYTES # BLD AUTO: 1.56 X10(3) UL (ref 1–4)
LYMPHOCYTES NFR BLD AUTO: 26.3 %
MCH RBC QN AUTO: 33.2 PG (ref 26–34)
MCHC RBC AUTO-ENTMCNC: 35.1 G/DL (ref 31–37)
MCV RBC AUTO: 94.4 FL
MONOCYTES # BLD AUTO: 0.88 X10(3) UL (ref 0.1–1)
MONOCYTES NFR BLD AUTO: 14.8 %
NEUTROPHILS # BLD AUTO: 3.21 X10 (3) UL (ref 1.5–7.7)
NEUTROPHILS # BLD AUTO: 3.21 X10(3) UL (ref 1.5–7.7)
NEUTROPHILS NFR BLD AUTO: 54 %
OSMOLALITY SERPL CALC.SUM OF ELEC: 283 MOSM/KG (ref 275–295)
P AXIS: 56 DEGREES
P-R INTERVAL: 168 MS
PLATELET # BLD AUTO: 159 10(3)UL (ref 150–450)
POTASSIUM SERPL-SCNC: 3.9 MMOL/L (ref 3.5–5.1)
PROT SERPL-MCNC: 7.5 G/DL (ref 5.7–8.2)
Q-T INTERVAL: 378 MS
QRS DURATION: 96 MS
QTC CALCULATION (BEZET): 469 MS
R AXIS: -33 DEGREES
RBC # BLD AUTO: 4.28 X10(6)UL
SODIUM SERPL-SCNC: 136 MMOL/L (ref 136–145)
T AXIS: 22 DEGREES
TROPONIN I SERPL HS-MCNC: 5 NG/L
VENTRICULAR RATE: 93 BPM
WBC # BLD AUTO: 5.9 X10(3) UL (ref 4–11)

## 2024-12-02 PROCEDURE — 85025 COMPLETE CBC W/AUTO DIFF WBC: CPT | Performed by: EMERGENCY MEDICINE

## 2024-12-02 PROCEDURE — 99285 EMERGENCY DEPT VISIT HI MDM: CPT

## 2024-12-02 PROCEDURE — 84484 ASSAY OF TROPONIN QUANT: CPT | Performed by: EMERGENCY MEDICINE

## 2024-12-02 PROCEDURE — 93005 ELECTROCARDIOGRAM TRACING: CPT

## 2024-12-02 PROCEDURE — 71045 X-RAY EXAM CHEST 1 VIEW: CPT | Performed by: EMERGENCY MEDICINE

## 2024-12-02 PROCEDURE — 93350 STRESS TTE ONLY: CPT | Performed by: EMERGENCY MEDICINE

## 2024-12-02 PROCEDURE — 71260 CT THORAX DX C+: CPT | Performed by: EMERGENCY MEDICINE

## 2024-12-02 PROCEDURE — 36415 COLL VENOUS BLD VENIPUNCTURE: CPT

## 2024-12-02 PROCEDURE — 80053 COMPREHEN METABOLIC PANEL: CPT | Performed by: EMERGENCY MEDICINE

## 2024-12-02 PROCEDURE — 85379 FIBRIN DEGRADATION QUANT: CPT | Performed by: EMERGENCY MEDICINE

## 2024-12-02 PROCEDURE — 93017 CV STRESS TEST TRACING ONLY: CPT | Performed by: EMERGENCY MEDICINE

## 2024-12-02 PROCEDURE — 93010 ELECTROCARDIOGRAM REPORT: CPT

## 2024-12-02 PROCEDURE — 93018 CV STRESS TEST I&R ONLY: CPT | Performed by: EMERGENCY MEDICINE

## 2024-12-02 NOTE — ED PROVIDER NOTES
Patient Seen in: [facility] In [place]      History     Chief Complaint   Patient presents with    Chest Pain Angina     Stated Complaint: chest pain right side that is caused by coughing and sneezing    Subjective:   HPI      Patient comes to the emergency department with a 1 to 2-day history of right upper chest pain which patient describes as sharp and worsened with any sort of chest wall movement, including deep inspiration, coughing and sneezing.  There is no history of trauma to the area.  He has no shortness of breath.  He states that he has had similar, intermittent episodes of discomfort over the past year, but has not sought medical attention.  His pain today caused him to come to the Emergency Department because of increased intensity and frequency.  His symptoms are not exertional in nature.  He has had no palpitations, syncope or near syncope.  The patient does have past history of diabetes, hypertension and high cholesterol.  He has no family history of coronary disease.  He does not smoke.    Objective:     Past Medical History:    Abdominal hernia    Anxiety    Blurred vision    Decorative tattoo    Diabetes mellitus (HCC)    Essential hypertension    Heartburn    High cholesterol    Stress    Type 1 diabetes mellitus (HCC)    Wears glasses              Past Surgical History:   Procedure Laterality Date    Anesth,surgery of shoulder      Colonoscopy  2023    Other      Other      shoulder - left    Other surgical history      Knee surgery, ACL 2001    Other surgical history      R Leg, Unspecified treatment 2 rods placed 1988    Vasectomy                  Social History     Socioeconomic History    Marital status:    Tobacco Use    Smoking status: Never    Smokeless tobacco: Never   Vaping Use    Vaping status: Never Used   Substance and Sexual Activity    Alcohol use: Yes     Alcohol/week: 4.0 standard drinks of alcohol     Types: 4 Standard drinks or equivalent per week      Comment: 1-3 glasses of scotch daily    Drug use: No   Other Topics Concern    Caffeine Concern Yes    Stress Concern No    Weight Concern No    Special Diet No    Exercise Yes    Seat Belt Yes     Social Drivers of Health     Financial Resource Strain: Low Risk  (5/6/2024)    Financial Resource Strain     Difficulty of Paying Living Expenses: Not hard at all   Food Insecurity: No Food Insecurity (5/1/2024)    Food Insecurity     Food Insecurity: Never true   Transportation Needs: No Transportation Needs (5/1/2024)    Transportation Needs     Lack of Transportation: No   Housing Stability: Low Risk  (5/1/2024)    Housing Stability     Housing Instability: No                  Physical Exam     ED Triage Vitals [12/02/24 0732]   BP (!) 163/79   Pulse 95   Resp 18   Temp 98.2 °F (36.8 °C)   Temp src Oral   SpO2 100 %   O2 Device None (Room air)       Current Vitals:   Vital Signs  BP: 140/80  Pulse: 88  Resp: 18  Temp: 97.8 °F (36.6 °C)  Temp src: Oral    Oxygen Therapy  SpO2: 99 %  O2 Device: None (Room air)        Physical Exam  Vitals and nursing note reviewed.   Constitutional:       Appearance: He is well-developed.   HENT:      Head: Normocephalic.   Cardiovascular:      Rate and Rhythm: Normal rate and regular rhythm.      Heart sounds: Normal heart sounds. No murmur heard.  Pulmonary:      Effort: Pulmonary effort is normal.      Breath sounds: Normal breath sounds.   Chest:      Chest wall: Tenderness present.      Comments: Mild discomfort which reproduces the patient's pain in the right upper anterior chest.  Abdominal:      General: Bowel sounds are normal.      Palpations: Abdomen is soft.      Tenderness: There is no abdominal tenderness. There is no guarding.   Musculoskeletal:         General: No tenderness. Normal range of motion.      Cervical back: Normal range of motion and neck supple.   Lymphadenopathy:      Cervical: No cervical adenopathy.   Skin:     General: Skin is warm and dry.       Findings: No rash.   Neurological:      Mental Status: He is alert and oriented to person, place, and time.      Sensory: No sensory deficit.            ED Course     Labs Reviewed   COMP METABOLIC PANEL (14) - Abnormal; Notable for the following components:       Result Value    Glucose 151 (*)     BUN 8 (*)     Creatinine 0.68 (*)     AST 53 (*)     Bilirubin, Total 2.2 (*)     All other components within normal limits   CBC WITH DIFFERENTIAL WITH PLATELET - Abnormal; Notable for the following components:    RBC 4.28 (*)     All other components within normal limits   D-DIMER - Abnormal; Notable for the following components:    D-Dimer 0.68 (*)     All other components within normal limits   TROPONIN I HIGH SENSITIVITY - Normal   RAINBOW DRAW LAVENDER   RAINBOW DRAW LIGHT GREEN   RAINBOW DRAW BLUE     EKG    Rate, intervals and axes as noted on EKG Report.  Rate: 93  Rhythm: Sinus Rhythm  Reading: Left axis deviation.  No evidence of acute ischemia.           ED Course as of 12/03/24 1518  ------------------------------------------------------------  Time: 12/02 0915  Value: Troponin I (High Sensitivity): 5  Comment: (Reviewed)  ------------------------------------------------------------  Time: 12/02 0915  Value: D-Dimer(!): 0.68  Comment: CT angiogram ordered to evaluate for differential of pulmonary embolus.  ------------------------------------------------------------  Time: 12/02 0915  Value: Comp Metabolic Panel (14)(!)  Comment: Mild hyperglycemia.  Mildly elevated bilirubin.  ------------------------------------------------------------  Time: 12/02 0915  Value: CBC With Differential With Platelet(!)  Comment: Unremarkable    ------------------------------------------------------------  Time: 12/02 1009  Value: XR CHEST AP PORTABLE  (CPT=71045)  Comment: Images were independently viewed by me and no infiltrate noted.  Mediastinal and cardiac silhouettes were  normal.    ------------------------------------------------------------  Time: 12/02 1033  Value: CT CHEST PE AORTA (IV ONLY) (CPT=71260)  Comment: No CT findings suggestive of dissection or pulmonary embolus.  However, on abdominal portion of the imaging, the patient is noted to have markedly abnormal liver consistent with cirrhosis.         Heart Score:    HEART Score      Title      Criteria Score   Age: 45-64 Age Score: 1   History: Slightly Suspicious Hx Score: 0     EKG: Normal EKG Score: 0   HTN: Yes   Hypercholesterolemia: Yes   Atherosclerosis/PVD: No     DM: Yes   BMI>30kg/m2: No   Smoking: No   Family History: No         Other Risk Factor Score: 3             Lab Results   Component Value Date    TROPHS 5 12/02/2024           HEART Score: 3        Risk of adverse cardiac event is 0.9-1.7%          Stress echocardiogram was noted to be unremarkable.       MDM      Patient comes to the Emergency Department with right sided chest pain.  Differential diagnose includes pulmonary embolus, myocardial ischemia, aortic dissection.  D-dimer was noted be slightly elevated, prompting a CT angiogram.  This was noted to be negative for pulmonary embolus.  However, patient was noted to have significant hepatic abnormalities suggestive of cirrhosis.  Patient's other lab work and heart score was negative.  Because of patient's other significant risk factors (diabetes, hypertension and dyslipidemia), a stress echocardiogram was performed which was noted be negative.  Patient was discharged home with instructed follow-up closely with primary care physician and also because of the liver findings, the patient was given contact information for gastroenterology as well.  He expressed understanding that he should return immediately for any acute change or worsening symptoms.        Medical Decision Making      Disposition and Plan     Clinical Impression:  1. Chest pain of uncertain etiology    2. Cirrhosis of liver without  ascites, unspecified hepatic cirrhosis type (HCC)         Disposition:  Discharge  12/2/2024  5:22 pm    Follow-up:  Matilde Dinh MD  [address] [address] [phone]    Follow up      [facility]  [address] [facility] [address]  Follow up      Pool Ramirez MD  [address] [facility]  [address] [facility] [address] [phone]    Follow up in 2 week(s)            Medications Prescribed:  Discharge Medication List as of 12/2/2024  5:24 PM              Supplementary Documentation:

## 2024-12-02 NOTE — ED PROVIDER NOTES
The patient was signed out to follow-up on the stress test.  I did discuss this case with Jacinto Ramos the APN from [facility].  This had a normal stress test but they will have him follow-up because of his high risk.  Patient was discharged home with close follow-up.

## 2024-12-02 NOTE — ED QUICK NOTES
Rounding Completed    Plan of Care reviewed. Waiting for CT scan.  Elimination needs assessed.  Provided warm blanket .    Bed is locked and in lowest position. Call light within reach.

## 2024-12-02 NOTE — PROGRESS NOTES
Pt completed ER stress echo. Pt walked 9 min on Gaston protocol with no symptoms. Achieved 90 percent APMHR. One pac. Echo pending.

## 2024-12-02 NOTE — ED INITIAL ASSESSMENT (HPI)
Pt to ed with c/o right sided chest pain that started 1 year ago but has gotten worse and more consistent this past weekend, standing up straight, sneezing or coughing makes pain worse. Pain is sharp in nature. Pain does not radiate, denies SOB or n/v

## 2025-01-15 ENCOUNTER — LAB ENCOUNTER (OUTPATIENT)
Dept: LAB | Age: 55
End: 2025-01-15
Attending: INTERNAL MEDICINE
Payer: COMMERCIAL

## 2025-01-15 DIAGNOSIS — R74.8 ELEVATED LIVER ENZYMES: ICD-10-CM

## 2025-01-15 DIAGNOSIS — E10.319 CONTROLLED TYPE 1 DIABETES MELLITUS WITH RETINOPATHY, MACULAR EDEMA PRESENCE UNSPECIFIED, UNSPECIFIED LATERALITY, UNSPECIFIED RETINOPATHY SEVERITY (HCC): ICD-10-CM

## 2025-01-15 LAB
ALBUMIN SERPL-MCNC: 4.3 G/DL (ref 3.2–4.8)
ALP LIVER SERPL-CCNC: 111 U/L
ALT SERPL-CCNC: 36 U/L
AST SERPL-CCNC: 32 U/L (ref ?–34)
BILIRUB DIRECT SERPL-MCNC: 0.8 MG/DL (ref ?–0.3)
BILIRUB SERPL-MCNC: 1.8 MG/DL (ref 0.3–1.2)
CHOLEST SERPL-MCNC: 107 MG/DL (ref ?–200)
FASTING PATIENT LIPID ANSWER: YES
HDLC SERPL-MCNC: 47 MG/DL (ref 40–59)
LDLC SERPL CALC-MCNC: 47 MG/DL (ref ?–100)
NONHDLC SERPL-MCNC: 60 MG/DL (ref ?–130)
PROT SERPL-MCNC: 7.7 G/DL (ref 5.7–8.2)
TRIGL SERPL-MCNC: 54 MG/DL (ref 30–149)
VLDLC SERPL CALC-MCNC: 8 MG/DL (ref 0–30)

## 2025-01-15 PROCEDURE — 80076 HEPATIC FUNCTION PANEL: CPT

## 2025-01-15 PROCEDURE — 36415 COLL VENOUS BLD VENIPUNCTURE: CPT

## 2025-01-15 PROCEDURE — 80061 LIPID PANEL: CPT

## 2025-04-24 NOTE — TELEPHONE ENCOUNTER
Protocol failed     LOV: 5/15/24   RTC: 1 yr   Filled: 5/15/24 #90 3 refill   Labs: 12/2/24   No future appointments.

## 2025-04-25 RX ORDER — LISINOPRIL 10 MG/1
10 TABLET ORAL DAILY
Qty: 90 TABLET | Refills: 0 | Status: SHIPPED | OUTPATIENT
Start: 2025-04-25

## 2025-07-21 LAB — HEMOGLOBIN A1C: 7 %

## 2025-07-21 RX ORDER — LISINOPRIL 10 MG/1
10 TABLET ORAL DAILY
Qty: 90 TABLET | Refills: 0 | Status: SHIPPED | OUTPATIENT
Start: 2025-07-21

## 2025-07-21 NOTE — TELEPHONE ENCOUNTER
Agree with plan   Protocol failed     LOV: 5/15/24   RTC: 1 yr   Filled: 4/25/25 #90   Labs: 12/2/24   No future appointments.

## (undated) NOTE — MR AVS SNAPSHOT
[facility] [place] [address] [place] [address] [phone]               Thank you for choosing us for your health care visit with Lucretia Loo, RN,CDE.   We are glad to serve you and happy to provide you with this summary You can access your MyChart to more actively manage your health care and view more details from this visit by going to [email or web address].   If you've recently had a stay at the Hospital you can access your discharge instructions in [address] by [email or web address]

## (undated) NOTE — Clinical Note
[facility] DIABETES SERVICES  [facility] [place] [address] [place] [address]  Blane Fernando CRISTA 1970 receives medical care for insulin-requiring Type 1 diabetes from the [place] [address].  He is required at a

## (undated) NOTE — ED AVS SNAPSHOT
Camden Adams   MRN: [id number]    Department:  [facility] in [place]   Date of Visit:  10/22/2017           Disclosure     Insurance plans vary and the physician(s) referred by the ER may not be covered by your plan.  Please con If you have been prescribed any medication(s), please fill your prescription right away and begin taking the medication(s) as directed    If the emergency physician has read X-rays, these will be re-interpreted by a radiologist.  If there is a significant

## (undated) NOTE — MR AVS SNAPSHOT
[facility] [address]  [facility] [address]  [phone]               Thank you for choosing us for your health care visit with Jhonatan Lan, RN,CDE.   We are glad to serve you and happy to provide you with this summary Insulin Pen Needle 32G X 4 MM Misc   4 injections / day   Commonly known as:  BD PEN NEEDLE JCARLOS U/F                   MyChart     Visit MyChart  You can access your MyChart to more actively manage your health care and view more details from this visit by

## (undated) NOTE — MR AVS SNAPSHOT
[facility] DI[facility]ET[facility] [place] [address] [place] [id number] [phone]               Thank you for choosing us for your health care visit with Anton Brewer RN,CDE.   We are glad to serve you and happy to provide you with this summary INDVIDUALIZED ASSESSMENT This is a one-on-one appointment with a certified diabetes educator.   An assessment will be performed and will identify the pump candidate's ability to: Perform label reading Identify portion sizes Use insulin to carbohydrate ratio [facility] [place] [address]   [phone]              Allergies as of Feb 01, 2017     Succinylcholine     Delayed awakening from surgery in 1988 For medical emergencies, dial 911.            Visit [facility] online at  [email or web address]

## (undated) NOTE — MR AVS SNAPSHOT
[facility] [address]  [address] [facility]  [phone]               Thank you for choosing us for your health care visit with oJel Vera MD.  We are glad to serve you and happy to provide you with treatment/medicines/tests as a result of today's visit.  For your safety, read the entire package insert of all medicines prescribed to you and be aware of all of the risks of treatment even beyond those discussed today.  All therapies have potential risk This list is accurate as of: 1/17/17  9:32 AM.  Always use your most recent med list.                LOLA CONTOUR MONITOR W/DEVICE Kit           LOLA MICROLET LANCETS Misc   Test twice daily           Glucose Blood Strp   Test twice daily for uncontrolle Call [phone] for help. MAINtagt is NOT to be used for urgent needs. For medical emergencies, dial 911.            Visit [facility] online at  [email or web address]

## (undated) NOTE — LETTER
06/26/17        [address]      Dear Chris Smith,    [facility] records indicate that you have outstanding lab work and or testing that was ordered for you and has not yet been completed:          TSH W Reflex To Free T4 [

## (undated) NOTE — Clinical Note
2017      Sanjeevbridgette Ybarra  1970,  receives medical care for insulin-requiring diabetes from the [place] [address]. He is required at all times to have access to:     Insulin Pens  Pen needles  Blood Glucose Meter  Blood Glucose Test Strip

## (undated) NOTE — MR AVS SNAPSHOT
[facility] [address]  [address] [facility] Mike Guerra  [phone]               Thank you for choosing us for your health care visit with Keyana Hammonds MD.  We are glad to serve you and happy to provide you with [phone], Go to [facility] A ER Building (For example CT scans, X rays, Ultrasound, MRI)  •Cardiac Testing in ER building Building A second floor Cardiac Testing  (For example, Holter Monitor, Cardiac Stress tests, 2d Echocardiograms)  •Gigi Hopkins To best provide you care, patients receiving maintenance medications need to be seen at least twice a year.        Allergies as of May 26, 2017     Succinylcholine     Delayed awakening from surgery in [address] [place] [address] Signs     BP Pulse He